# Patient Record
Sex: FEMALE | Race: BLACK OR AFRICAN AMERICAN | NOT HISPANIC OR LATINO
[De-identification: names, ages, dates, MRNs, and addresses within clinical notes are randomized per-mention and may not be internally consistent; named-entity substitution may affect disease eponyms.]

---

## 2017-01-27 ENCOUNTER — APPOINTMENT (OUTPATIENT)
Dept: INTERNAL MEDICINE | Facility: CLINIC | Age: 62
End: 2017-01-27

## 2017-01-27 VITALS
WEIGHT: 163 LBS | HEIGHT: 70 IN | TEMPERATURE: 97.7 F | SYSTOLIC BLOOD PRESSURE: 118 MMHG | DIASTOLIC BLOOD PRESSURE: 80 MMHG | OXYGEN SATURATION: 97 % | HEART RATE: 77 BPM | BODY MASS INDEX: 23.34 KG/M2

## 2017-03-09 ENCOUNTER — RX RENEWAL (OUTPATIENT)
Age: 62
End: 2017-03-09

## 2017-04-06 ENCOUNTER — RX RENEWAL (OUTPATIENT)
Age: 62
End: 2017-04-06

## 2017-04-13 ENCOUNTER — FORM ENCOUNTER (OUTPATIENT)
Age: 62
End: 2017-04-13

## 2017-04-14 ENCOUNTER — APPOINTMENT (OUTPATIENT)
Dept: INTERNAL MEDICINE | Facility: CLINIC | Age: 62
End: 2017-04-14

## 2017-04-14 ENCOUNTER — OUTPATIENT (OUTPATIENT)
Dept: OUTPATIENT SERVICES | Facility: HOSPITAL | Age: 62
LOS: 1 days | End: 2017-04-14
Payer: COMMERCIAL

## 2017-04-14 VITALS
BODY MASS INDEX: 23.78 KG/M2 | HEIGHT: 70.5 IN | SYSTOLIC BLOOD PRESSURE: 122 MMHG | TEMPERATURE: 98.8 F | WEIGHT: 168 LBS | HEART RATE: 91 BPM | OXYGEN SATURATION: 98 % | DIASTOLIC BLOOD PRESSURE: 80 MMHG

## 2017-04-14 DIAGNOSIS — D25.9 LEIOMYOMA OF UTERUS, UNSPECIFIED: ICD-10-CM

## 2017-04-14 LAB
25(OH)D3 SERPL-MCNC: 33.6 NG/ML
AFP-TM SERPL-MCNC: 5.5 NG/ML
ALBUMIN SERPL ELPH-MCNC: 4.1 G/DL
ALP BLD-CCNC: 61 U/L
ALT SERPL-CCNC: 17 U/L
ANION GAP SERPL CALC-SCNC: 14 MMOL/L
AST SERPL-CCNC: 18 U/L
BASOPHILS # BLD AUTO: 0.04 K/UL
BASOPHILS NFR BLD AUTO: 0.4 %
BILIRUB SERPL-MCNC: 0.5 MG/DL
BUN SERPL-MCNC: 16 MG/DL
CALCIUM SERPL-MCNC: 9.9 MG/DL
CHLORIDE SERPL-SCNC: 103 MMOL/L
CHOLEST SERPL-MCNC: 241 MG/DL
CHOLEST/HDLC SERPL: 3.8 RATIO
CO2 SERPL-SCNC: 25 MMOL/L
CREAT SERPL-MCNC: 0.87 MG/DL
EOSINOPHIL # BLD AUTO: 0.48 K/UL
EOSINOPHIL NFR BLD AUTO: 5.3 %
GLUCOSE SERPL-MCNC: 74 MG/DL
HCT VFR BLD CALC: 44 %
HCV RNA SERPL NAA DL=5-ACNC: ABNORMAL IU/ML
HCV RNA SERPL NAA+PROBE-LOG IU: 5.4 LOGIU/ML
HDLC SERPL-MCNC: 64 MG/DL
HGB BLD-MCNC: 14.1 G/DL
IMM GRANULOCYTES NFR BLD AUTO: 0.1 %
LDLC SERPL CALC-MCNC: 159 MG/DL
LYMPHOCYTES # BLD AUTO: 2.93 K/UL
LYMPHOCYTES NFR BLD AUTO: 32.3 %
MAN DIFF?: NORMAL
MCHC RBC-ENTMCNC: 30.1 PG
MCHC RBC-ENTMCNC: 32 GM/DL
MCV RBC AUTO: 93.8 FL
MONOCYTES # BLD AUTO: 0.74 K/UL
MONOCYTES NFR BLD AUTO: 8.2 %
NEUTROPHILS # BLD AUTO: 4.86 K/UL
NEUTROPHILS NFR BLD AUTO: 53.7 %
PLATELET # BLD AUTO: 219 K/UL
POTASSIUM SERPL-SCNC: 4.2 MMOL/L
PROT SERPL-MCNC: 8.5 G/DL
RBC # BLD: 4.69 M/UL
RBC # FLD: 14 %
SODIUM SERPL-SCNC: 142 MMOL/L
T4 FREE SERPL-MCNC: 1.4 NG/DL
TRIGL SERPL-MCNC: 92 MG/DL
TSH SERPL-ACNC: 4.93 UIU/ML
WBC # FLD AUTO: 9.06 K/UL

## 2017-04-14 PROCEDURE — 76536 US EXAM OF HEAD AND NECK: CPT

## 2017-04-14 PROCEDURE — 76830 TRANSVAGINAL US NON-OB: CPT

## 2017-04-14 PROCEDURE — 76536 US EXAM OF HEAD AND NECK: CPT | Mod: 26

## 2017-04-14 PROCEDURE — 76830 TRANSVAGINAL US NON-OB: CPT | Mod: 26

## 2017-04-14 PROCEDURE — 76700 US EXAM ABDOM COMPLETE: CPT

## 2017-04-14 PROCEDURE — 76700 US EXAM ABDOM COMPLETE: CPT | Mod: 26

## 2017-04-14 PROCEDURE — 76856 US EXAM PELVIC COMPLETE: CPT | Mod: 26

## 2017-04-14 PROCEDURE — 76856 US EXAM PELVIC COMPLETE: CPT

## 2017-04-14 RX ORDER — AZITHROMYCIN DIHYDRATE 250 MG/1
250 TABLET, FILM COATED ORAL
Qty: 1 | Refills: 0 | Status: DISCONTINUED | COMMUNITY
Start: 2017-01-27 | End: 2017-04-14

## 2017-05-05 ENCOUNTER — MEDICATION RENEWAL (OUTPATIENT)
Age: 62
End: 2017-05-05

## 2017-06-14 ENCOUNTER — APPOINTMENT (OUTPATIENT)
Age: 62
End: 2017-06-14

## 2017-06-14 VITALS
HEART RATE: 86 BPM | BODY MASS INDEX: 23.08 KG/M2 | RESPIRATION RATE: 12 BRPM | SYSTOLIC BLOOD PRESSURE: 153 MMHG | HEIGHT: 70.5 IN | TEMPERATURE: 98.3 F | DIASTOLIC BLOOD PRESSURE: 91 MMHG | WEIGHT: 163 LBS

## 2017-06-15 LAB
ALBUMIN SERPL ELPH-MCNC: 4.3 G/DL
ALP BLD-CCNC: 49 U/L
ALT SERPL-CCNC: 32 U/L
ANION GAP SERPL CALC-SCNC: 17 MMOL/L
AST SERPL-CCNC: 35 U/L
BASOPHILS # BLD AUTO: 0.03 K/UL
BASOPHILS NFR BLD AUTO: 0.4 %
BILIRUB SERPL-MCNC: 0.4 MG/DL
BUN SERPL-MCNC: 20 MG/DL
CALCIUM SERPL-MCNC: 10.1 MG/DL
CHLORIDE SERPL-SCNC: 100 MMOL/L
CO2 SERPL-SCNC: 23 MMOL/L
CREAT SERPL-MCNC: 0.96 MG/DL
EOSINOPHIL # BLD AUTO: 0.36 K/UL
EOSINOPHIL NFR BLD AUTO: 5 %
HAV IGG+IGM SER QL: NONREACTIVE
HBV SURFACE AB SER QL: NONREACTIVE
HBV SURFACE AG SER QL: NONREACTIVE
HCT VFR BLD CALC: 42.2 %
HGB BLD-MCNC: 13.7 G/DL
IMM GRANULOCYTES NFR BLD AUTO: 0.1 %
LYMPHOCYTES # BLD AUTO: 3.12 K/UL
LYMPHOCYTES NFR BLD AUTO: 43.3 %
MAN DIFF?: NORMAL
MCHC RBC-ENTMCNC: 29.5 PG
MCHC RBC-ENTMCNC: 32.5 GM/DL
MCV RBC AUTO: 90.8 FL
MONOCYTES # BLD AUTO: 0.57 K/UL
MONOCYTES NFR BLD AUTO: 7.9 %
NEUTROPHILS # BLD AUTO: 3.11 K/UL
NEUTROPHILS NFR BLD AUTO: 43.3 %
PLATELET # BLD AUTO: 295 K/UL
POTASSIUM SERPL-SCNC: 4.2 MMOL/L
PROT SERPL-MCNC: 9.3 G/DL
RBC # BLD: 4.65 M/UL
RBC # FLD: 13.9 %
SMOOTH MUSCLE AB SER QL IF: NORMAL
SODIUM SERPL-SCNC: 140 MMOL/L
WBC # FLD AUTO: 7.2 K/UL

## 2017-06-19 LAB
HCV GENTYP BLD NAA+PROBE: ABNORMAL
HCV RNA SERPL NAA DL=5-ACNC: ABNORMAL IU/ML
HCV RNA SERPL NAA+PROBE-LOG IU: 5.54 LOGIU/ML
IGA SER QL IEP: 236 MG/DL
IGG SER QL IEP: 2340 MG/DL
IGM SER QL IEP: 185 MG/DL

## 2017-07-31 ENCOUNTER — APPOINTMENT (OUTPATIENT)
Age: 62
End: 2017-07-31
Payer: COMMERCIAL

## 2017-07-31 VITALS
HEIGHT: 70.5 IN | SYSTOLIC BLOOD PRESSURE: 143 MMHG | BODY MASS INDEX: 23.64 KG/M2 | DIASTOLIC BLOOD PRESSURE: 92 MMHG | HEART RATE: 71 BPM | WEIGHT: 167 LBS | TEMPERATURE: 98.3 F | RESPIRATION RATE: 14 BRPM

## 2017-07-31 PROCEDURE — 90636 HEP A/HEP B VACC ADULT IM: CPT

## 2017-07-31 PROCEDURE — 91200 LIVER ELASTOGRAPHY: CPT

## 2017-07-31 PROCEDURE — 90471 IMMUNIZATION ADMIN: CPT

## 2017-07-31 PROCEDURE — ZZZZZ: CPT

## 2017-07-31 PROCEDURE — 99213 OFFICE O/P EST LOW 20 MIN: CPT | Mod: 25

## 2017-08-23 RX ORDER — SULFAMETHOXAZOLE AND TRIMETHOPRIM 800; 160 MG/1; MG/1
800-160 TABLET ORAL
Qty: 14 | Refills: 0 | Status: DISCONTINUED | OUTPATIENT
Start: 2017-05-05 | End: 2017-08-23

## 2017-08-31 ENCOUNTER — APPOINTMENT (OUTPATIENT)
Age: 62
End: 2017-08-31
Payer: COMMERCIAL

## 2017-08-31 VITALS
RESPIRATION RATE: 14 BRPM | DIASTOLIC BLOOD PRESSURE: 77 MMHG | SYSTOLIC BLOOD PRESSURE: 139 MMHG | HEART RATE: 83 BPM | HEIGHT: 70.5 IN | BODY MASS INDEX: 23.36 KG/M2 | TEMPERATURE: 98.3 F | WEIGHT: 165 LBS

## 2017-08-31 PROCEDURE — 90471 IMMUNIZATION ADMIN: CPT

## 2017-08-31 PROCEDURE — 90746 HEPB VACCINE 3 DOSE ADULT IM: CPT

## 2017-08-31 PROCEDURE — 99213 OFFICE O/P EST LOW 20 MIN: CPT | Mod: 25

## 2017-09-19 LAB
ALBUMIN SERPL ELPH-MCNC: 4 G/DL
ALP BLD-CCNC: 62 U/L
ALT SERPL-CCNC: 13 U/L
ANION GAP SERPL CALC-SCNC: 13 MMOL/L
AST SERPL-CCNC: 22 U/L
BASOPHILS # BLD AUTO: 0.06 K/UL
BASOPHILS NFR BLD AUTO: 0.8 %
BILIRUB SERPL-MCNC: 0.4 MG/DL
BUN SERPL-MCNC: 9 MG/DL
CALCIUM SERPL-MCNC: 9.8 MG/DL
CHLORIDE SERPL-SCNC: 102 MMOL/L
CO2 SERPL-SCNC: 26 MMOL/L
CREAT SERPL-MCNC: 1.06 MG/DL
EOSINOPHIL # BLD AUTO: 0.45 K/UL
EOSINOPHIL NFR BLD AUTO: 6 %
HCT VFR BLD CALC: 42.5 %
HGB BLD-MCNC: 13.9 G/DL
IMM GRANULOCYTES NFR BLD AUTO: 0.1 %
LYMPHOCYTES # BLD AUTO: 3.15 K/UL
LYMPHOCYTES NFR BLD AUTO: 41.7 %
MAN DIFF?: NORMAL
MCHC RBC-ENTMCNC: 29.5 PG
MCHC RBC-ENTMCNC: 32.7 GM/DL
MCV RBC AUTO: 90.2 FL
MONOCYTES # BLD AUTO: 0.71 K/UL
MONOCYTES NFR BLD AUTO: 9.4 %
NEUTROPHILS # BLD AUTO: 3.18 K/UL
NEUTROPHILS NFR BLD AUTO: 42 %
PLATELET # BLD AUTO: 293 K/UL
POTASSIUM SERPL-SCNC: 4 MMOL/L
PROT SERPL-MCNC: 9 G/DL
RBC # BLD: 4.71 M/UL
RBC # FLD: 13.3 %
SODIUM SERPL-SCNC: 141 MMOL/L
WBC # FLD AUTO: 7.56 K/UL

## 2017-09-21 LAB
HCV RNA SERPL NAA DL=5-ACNC: NORMAL IU/ML
HCV RNA SERPL NAA+PROBE-LOG IU: <1.08 LOGIU/ML

## 2017-09-25 ENCOUNTER — RX RENEWAL (OUTPATIENT)
Age: 62
End: 2017-09-25

## 2017-09-26 ENCOUNTER — RESULT REVIEW (OUTPATIENT)
Age: 62
End: 2017-09-26

## 2017-09-28 ENCOUNTER — APPOINTMENT (OUTPATIENT)
Age: 62
End: 2017-09-28

## 2017-10-06 ENCOUNTER — APPOINTMENT (OUTPATIENT)
Dept: INTERNAL MEDICINE | Facility: CLINIC | Age: 62
End: 2017-10-06

## 2017-10-17 ENCOUNTER — APPOINTMENT (OUTPATIENT)
Age: 62
End: 2017-10-17

## 2017-10-23 LAB
ALBUMIN SERPL ELPH-MCNC: 4 G/DL
ALP BLD-CCNC: 60 U/L
ALT SERPL-CCNC: 16 U/L
ANION GAP SERPL CALC-SCNC: 17 MMOL/L
AST SERPL-CCNC: 24 U/L
BILIRUB SERPL-MCNC: 0.3 MG/DL
BUN SERPL-MCNC: 8 MG/DL
CALCIUM SERPL-MCNC: 10.1 MG/DL
CHLORIDE SERPL-SCNC: 101 MMOL/L
CO2 SERPL-SCNC: 23 MMOL/L
CREAT SERPL-MCNC: 0.99 MG/DL
HCV RNA SERPL NAA DL=5-ACNC: NOT DETECTED
HCV RNA SERPL NAA+PROBE-LOG IU: NOT DETECTED LOGIU/ML
POTASSIUM SERPL-SCNC: 4.4 MMOL/L
PROT SERPL-MCNC: 9.4 G/DL
SODIUM SERPL-SCNC: 141 MMOL/L

## 2017-10-24 ENCOUNTER — RX RENEWAL (OUTPATIENT)
Age: 62
End: 2017-10-24

## 2017-10-25 ENCOUNTER — APPOINTMENT (OUTPATIENT)
Age: 62
End: 2017-10-25

## 2017-10-27 ENCOUNTER — APPOINTMENT (OUTPATIENT)
Age: 62
End: 2017-10-27

## 2017-11-06 ENCOUNTER — FORM ENCOUNTER (OUTPATIENT)
Age: 62
End: 2017-11-06

## 2017-11-07 ENCOUNTER — APPOINTMENT (OUTPATIENT)
Dept: INTERNAL MEDICINE | Facility: CLINIC | Age: 62
End: 2017-11-07
Payer: COMMERCIAL

## 2017-11-07 ENCOUNTER — OUTPATIENT (OUTPATIENT)
Dept: OUTPATIENT SERVICES | Facility: HOSPITAL | Age: 62
LOS: 1 days | End: 2017-11-07
Payer: COMMERCIAL

## 2017-11-07 ENCOUNTER — APPOINTMENT (OUTPATIENT)
Dept: RADIOLOGY | Facility: CLINIC | Age: 62
End: 2017-11-07
Payer: COMMERCIAL

## 2017-11-07 ENCOUNTER — LABORATORY RESULT (OUTPATIENT)
Age: 62
End: 2017-11-07

## 2017-11-07 VITALS
OXYGEN SATURATION: 96 % | WEIGHT: 166 LBS | HEART RATE: 67 BPM | SYSTOLIC BLOOD PRESSURE: 154 MMHG | BODY MASS INDEX: 23.5 KG/M2 | HEIGHT: 70.5 IN | TEMPERATURE: 98.5 F | DIASTOLIC BLOOD PRESSURE: 90 MMHG

## 2017-11-07 DIAGNOSIS — J20.9 ACUTE BRONCHITIS, UNSPECIFIED: ICD-10-CM

## 2017-11-07 PROCEDURE — 99214 OFFICE O/P EST MOD 30 MIN: CPT | Mod: 25

## 2017-11-07 PROCEDURE — 90686 IIV4 VACC NO PRSV 0.5 ML IM: CPT

## 2017-11-07 PROCEDURE — G0008: CPT

## 2017-11-07 PROCEDURE — 71046 X-RAY EXAM CHEST 2 VIEWS: CPT

## 2017-11-07 PROCEDURE — 71020: CPT | Mod: 26

## 2017-11-07 RX ORDER — LEDIPASVIR AND SOFOSBUVIR 90; 400 MG/1; MG/1
90-400 TABLET, FILM COATED ORAL
Qty: 28 | Refills: 2 | Status: COMPLETED | COMMUNITY
Start: 2017-07-31 | End: 2017-11-07

## 2017-11-24 ENCOUNTER — RX RENEWAL (OUTPATIENT)
Age: 62
End: 2017-11-24

## 2017-11-28 ENCOUNTER — MED ADMIN CHARGE (OUTPATIENT)
Age: 62
End: 2017-11-28

## 2017-11-28 ENCOUNTER — MEDICATION RENEWAL (OUTPATIENT)
Age: 62
End: 2017-11-28

## 2017-11-28 LAB
BASOPHILS # BLD AUTO: 0.04 K/UL
BASOPHILS NFR BLD AUTO: 0.5 %
EOSINOPHIL # BLD AUTO: 0.41 K/UL
EOSINOPHIL NFR BLD AUTO: 5.2 %
HBA1C MFR BLD HPLC: 5.7 %
HCT VFR BLD CALC: 46.9 %
HGB BLD-MCNC: 15.7 G/DL
IMM GRANULOCYTES NFR BLD AUTO: 0.6 %
LYMPHOCYTES # BLD AUTO: 2.75 K/UL
LYMPHOCYTES NFR BLD AUTO: 34.9 %
MAN DIFF?: NORMAL
MCHC RBC-ENTMCNC: 30.2 PG
MCHC RBC-ENTMCNC: 33.5 GM/DL
MCV RBC AUTO: 90.2 FL
MONOCYTES # BLD AUTO: 0.46 K/UL
MONOCYTES NFR BLD AUTO: 5.8 %
NEUTROPHILS # BLD AUTO: 4.18 K/UL
NEUTROPHILS NFR BLD AUTO: 53 %
PLATELET # BLD AUTO: 248 K/UL
RBC # BLD: 5.2 M/UL
RBC # FLD: 13.3 %
T4 FREE SERPL-MCNC: 1.3 NG/DL
TSH SERPL-ACNC: 8.97 UIU/ML
WBC # FLD AUTO: 7.89 K/UL

## 2017-11-28 RX ORDER — AZITHROMYCIN 250 MG/1
250 TABLET, FILM COATED ORAL
Qty: 1 | Refills: 0 | Status: DISCONTINUED | COMMUNITY
Start: 2017-11-07 | End: 2017-11-28

## 2018-02-06 ENCOUNTER — APPOINTMENT (OUTPATIENT)
Age: 63
End: 2018-02-06

## 2018-02-09 ENCOUNTER — APPOINTMENT (OUTPATIENT)
Age: 63
End: 2018-02-09

## 2018-02-21 ENCOUNTER — RX RENEWAL (OUTPATIENT)
Age: 63
End: 2018-02-21

## 2018-03-20 ENCOUNTER — APPOINTMENT (OUTPATIENT)
Dept: INTERNAL MEDICINE | Facility: CLINIC | Age: 63
End: 2018-03-20
Payer: COMMERCIAL

## 2018-03-20 VITALS
SYSTOLIC BLOOD PRESSURE: 138 MMHG | OXYGEN SATURATION: 98 % | WEIGHT: 158 LBS | HEART RATE: 79 BPM | DIASTOLIC BLOOD PRESSURE: 80 MMHG | HEIGHT: 70.5 IN | BODY MASS INDEX: 22.37 KG/M2

## 2018-03-20 PROCEDURE — 99214 OFFICE O/P EST MOD 30 MIN: CPT

## 2018-03-27 ENCOUNTER — APPOINTMENT (OUTPATIENT)
Dept: DERMATOLOGY | Facility: CLINIC | Age: 63
End: 2018-03-27
Payer: COMMERCIAL

## 2018-03-27 VITALS
SYSTOLIC BLOOD PRESSURE: 135 MMHG | DIASTOLIC BLOOD PRESSURE: 89 MMHG | HEART RATE: 62 BPM | HEIGHT: 70.5 IN | OXYGEN SATURATION: 99 %

## 2018-03-27 DIAGNOSIS — L81.9 DISORDER OF PIGMENTATION, UNSPECIFIED: ICD-10-CM

## 2018-03-27 PROCEDURE — 99243 OFF/OP CNSLTJ NEW/EST LOW 30: CPT

## 2018-04-30 ENCOUNTER — APPOINTMENT (OUTPATIENT)
Dept: INTERNAL MEDICINE | Facility: CLINIC | Age: 63
End: 2018-04-30
Payer: COMMERCIAL

## 2018-04-30 ENCOUNTER — LABORATORY RESULT (OUTPATIENT)
Age: 63
End: 2018-04-30

## 2018-04-30 VITALS
HEART RATE: 67 BPM | BODY MASS INDEX: 22.05 KG/M2 | SYSTOLIC BLOOD PRESSURE: 138 MMHG | DIASTOLIC BLOOD PRESSURE: 80 MMHG | OXYGEN SATURATION: 98 % | WEIGHT: 154 LBS | HEIGHT: 70 IN

## 2018-04-30 DIAGNOSIS — J02.0 STREPTOCOCCAL PHARYNGITIS: ICD-10-CM

## 2018-04-30 DIAGNOSIS — N39.0 URINARY TRACT INFECTION, SITE NOT SPECIFIED: ICD-10-CM

## 2018-04-30 DIAGNOSIS — M54.42 LUMBAGO WITH SCIATICA, LEFT SIDE: ICD-10-CM

## 2018-04-30 DIAGNOSIS — E04.0 NONTOXIC DIFFUSE GOITER: ICD-10-CM

## 2018-04-30 DIAGNOSIS — Z87.09 PERSONAL HISTORY OF OTHER DISEASES OF THE RESPIRATORY SYSTEM: ICD-10-CM

## 2018-04-30 PROCEDURE — 36415 COLL VENOUS BLD VENIPUNCTURE: CPT

## 2018-04-30 PROCEDURE — 99396 PREV VISIT EST AGE 40-64: CPT | Mod: 25

## 2018-04-30 RX ORDER — DOXYCYCLINE HYCLATE 100 MG/1
100 TABLET ORAL DAILY
Qty: 7 | Refills: 1 | Status: DISCONTINUED | COMMUNITY
Start: 2018-03-20 | End: 2018-04-30

## 2018-05-10 LAB
25(OH)D3 SERPL-MCNC: 14 NG/ML
ALBUMIN MFR SERPL ELPH: 47.4 %
ALBUMIN SERPL ELPH-MCNC: 4.5 G/DL
ALBUMIN SERPL-MCNC: 4.3 G/DL
ALBUMIN/GLOB SERPL: 0.9 RATIO
ALP BLD-CCNC: 68 U/L
ALPHA1 GLOB MFR SERPL ELPH: 3.2 %
ALPHA1 GLOB SERPL ELPH-MCNC: 0.3 G/DL
ALPHA2 GLOB MFR SERPL ELPH: 10.1 %
ALPHA2 GLOB SERPL ELPH-MCNC: 0.9 G/DL
ALT SERPL-CCNC: 18 U/L
ANION GAP SERPL CALC-SCNC: 17 MMOL/L
AST SERPL-CCNC: 22 U/L
B-GLOBULIN MFR SERPL ELPH: 16.3 %
B-GLOBULIN SERPL ELPH-MCNC: 1.5 G/DL
BASOPHILS # BLD AUTO: 0.04 K/UL
BASOPHILS NFR BLD AUTO: 0.6 %
BILIRUB SERPL-MCNC: 0.3 MG/DL
BUN SERPL-MCNC: 13 MG/DL
CALCIUM SERPL-MCNC: 10.2 MG/DL
CHLORIDE SERPL-SCNC: 100 MMOL/L
CHOLEST SERPL-MCNC: 251 MG/DL
CHOLEST/HDLC SERPL: 3.8 RATIO
CO2 SERPL-SCNC: 26 MMOL/L
CREAT SERPL-MCNC: 0.84 MG/DL
EOSINOPHIL # BLD AUTO: 0.4 K/UL
EOSINOPHIL NFR BLD AUTO: 5.5 %
GAMMA GLOB FLD ELPH-MCNC: 2.1 G/DL
GAMMA GLOB MFR SERPL ELPH: 23 %
GLUCOSE SERPL-MCNC: 81 MG/DL
HBA1C MFR BLD HPLC: 5.8 %
HCT VFR BLD CALC: 47 %
HCV RNA FLD QL NAA+PROBE: NORMAL
HCV RNA SPEC QL PROBE+SIG AMP: NOT DETECTED
HDLC SERPL-MCNC: 66 MG/DL
HGB BLD-MCNC: 15.7 G/DL
IMM GRANULOCYTES NFR BLD AUTO: 0 %
INTERPRETATION SERPL IEP-IMP: NORMAL
LDLC SERPL CALC-MCNC: 167 MG/DL
LYMPHOCYTES # BLD AUTO: 2.8 K/UL
LYMPHOCYTES NFR BLD AUTO: 38.8 %
MAN DIFF?: NORMAL
MCHC RBC-ENTMCNC: 30.7 PG
MCHC RBC-ENTMCNC: 33.4 GM/DL
MCV RBC AUTO: 92 FL
MONOCYTES # BLD AUTO: 0.43 K/UL
MONOCYTES NFR BLD AUTO: 6 %
NEUTROPHILS # BLD AUTO: 3.55 K/UL
NEUTROPHILS NFR BLD AUTO: 49.1 %
PLATELET # BLD AUTO: 258 K/UL
POTASSIUM SERPL-SCNC: 4.3 MMOL/L
PROT SERPL-MCNC: 9 G/DL
RBC # BLD: 5.11 M/UL
RBC # FLD: 13.5 %
SODIUM SERPL-SCNC: 143 MMOL/L
T4 FREE SERPL-MCNC: 1.5 NG/DL
TRIGL SERPL-MCNC: 90 MG/DL
TSH SERPL-ACNC: 2.92 UIU/ML
WBC # FLD AUTO: 7.22 K/UL

## 2018-06-22 ENCOUNTER — APPOINTMENT (OUTPATIENT)
Dept: INTERNAL MEDICINE | Facility: CLINIC | Age: 63
End: 2018-06-22

## 2018-07-05 ENCOUNTER — APPOINTMENT (OUTPATIENT)
Dept: INTERNAL MEDICINE | Facility: CLINIC | Age: 63
End: 2018-07-05
Payer: SELF-PAY

## 2018-07-05 VITALS
BODY MASS INDEX: 22.76 KG/M2 | TEMPERATURE: 98.2 F | OXYGEN SATURATION: 97 % | SYSTOLIC BLOOD PRESSURE: 140 MMHG | HEART RATE: 77 BPM | WEIGHT: 159 LBS | DIASTOLIC BLOOD PRESSURE: 90 MMHG | HEIGHT: 70 IN

## 2018-07-05 PROCEDURE — 99214 OFFICE O/P EST MOD 30 MIN: CPT

## 2018-07-05 NOTE — ASSESSMENT
[FreeTextEntry1] : The patient is a 63-year-old female with former drug and alcohol abuse at, smoker, history of chronic hepatitis C status post therapy, pulmonary nodules, hypertension, hypothyroidism, vitamin D deficiency and cervical neck disease polycythemia who presents for complaint of left arm numbness and left thigh bruise\par \par #1 left arm numbness\par Most likely cervical radiculopathy\par Repeat MRI of the neck\par Follow up in 4 weeks\par \par #2 hypertension\par DC'd hydrochlorothiazide stop lisinopril hydrochlorothiazide 10 mg/25 one tablet p.o. q. day\par Follow up in 4 weeks\par \par #3 pulmonary nodules\par Repeat CT patient missed appointment\par \par #4 health maintenance\par Reschedule colonoscopy\par \par #5 ecchymosis left thigh\par Normal platelet count last blood work\par Warm soaks observe

## 2018-07-05 NOTE — PHYSICAL EXAM
[No Acute Distress] : no acute distress [Well Nourished] : well nourished [Normal Sclera/Conjunctiva] : normal sclera/conjunctiva [PERRL] : pupils equal round and reactive to light [Normal Outer Ear/Nose] : the outer ears and nose were normal in appearance [Normal Oropharynx] : the oropharynx was normal [No JVD] : no jugular venous distention [Supple] : supple [No Respiratory Distress] : no respiratory distress  [Clear to Auscultation] : lungs were clear to auscultation bilaterally [No Accessory Muscle Use] : no accessory muscle use [Normal Rate] : normal rate  [Regular Rhythm] : with a regular rhythm [Normal S1, S2] : normal S1 and S2 [Soft] : abdomen soft [Non Tender] : non-tender [No HSM] : no HSM [Normal Bowel Sounds] : normal bowel sounds [No CVA Tenderness] : no CVA  tenderness [No Spinal Tenderness] : no spinal tenderness [Speech Grossly Normal] : speech grossly normal [Normal Mood] : the mood was normal [de-identified] : negative tinel and phalen sign

## 2018-07-05 NOTE — HISTORY OF PRESENT ILLNESS
[FreeTextEntry8] : CC: Left arm numbness and bruise on left calf\par \par The patient is a 63-year-old female with history of former alcohol drug abuse, smoking, prediabetes, hypertension, hypothyroidism, fibroids, history of chronic hep C status post treatment, allergic rhinitis, pulmonary nodules, who presents for complaint of several week history of left arm numbness progressive no weakness no trauma not worse at night. Otherwise patient feels well complains of left calf bruise denies fever, chills, polyuria, polydipsia cold or heat intolerance

## 2018-07-05 NOTE — HEALTH RISK ASSESSMENT
[0] : 2) Feeling down, depressed, or hopeless: Not at all (0) [] : No [de-identified] : former [de-identified] : former

## 2018-07-05 NOTE — REVIEW OF SYSTEMS
[Negative] : Neurological [Dizziness] : no dizziness [Fainting] : no fainting [Confusion] : no confusion [Unsteady Walking] : no ataxia [de-identified] : left calf 5 cm ecchymose [de-identified] : negative Tinel and Phalen

## 2018-11-26 ENCOUNTER — RX RENEWAL (OUTPATIENT)
Age: 63
End: 2018-11-26

## 2019-02-25 ENCOUNTER — RX RENEWAL (OUTPATIENT)
Age: 64
End: 2019-02-25

## 2019-05-29 ENCOUNTER — RX RENEWAL (OUTPATIENT)
Age: 64
End: 2019-05-29

## 2019-08-28 ENCOUNTER — RX RENEWAL (OUTPATIENT)
Age: 64
End: 2019-08-28

## 2019-10-08 ENCOUNTER — APPOINTMENT (OUTPATIENT)
Dept: INTERNAL MEDICINE | Facility: CLINIC | Age: 64
End: 2019-10-08

## 2019-10-08 ENCOUNTER — APPOINTMENT (OUTPATIENT)
Dept: INTERNAL MEDICINE | Facility: CLINIC | Age: 64
End: 2019-10-08
Payer: COMMERCIAL

## 2019-10-08 ENCOUNTER — LABORATORY RESULT (OUTPATIENT)
Age: 64
End: 2019-10-08

## 2019-10-08 VITALS — DIASTOLIC BLOOD PRESSURE: 90 MMHG | SYSTOLIC BLOOD PRESSURE: 140 MMHG

## 2019-10-08 VITALS
HEART RATE: 69 BPM | BODY MASS INDEX: 22.76 KG/M2 | WEIGHT: 159 LBS | HEIGHT: 70 IN | SYSTOLIC BLOOD PRESSURE: 140 MMHG | DIASTOLIC BLOOD PRESSURE: 80 MMHG | OXYGEN SATURATION: 97 %

## 2019-10-08 DIAGNOSIS — M54.12 RADICULOPATHY, CERVICAL REGION: ICD-10-CM

## 2019-10-08 PROCEDURE — 99406 BEHAV CHNG SMOKING 3-10 MIN: CPT

## 2019-10-08 PROCEDURE — 36415 COLL VENOUS BLD VENIPUNCTURE: CPT

## 2019-10-08 PROCEDURE — 99215 OFFICE O/P EST HI 40 MIN: CPT | Mod: 25

## 2019-10-22 ENCOUNTER — MEDICATION RENEWAL (OUTPATIENT)
Age: 64
End: 2019-10-22

## 2019-10-22 ENCOUNTER — APPOINTMENT (OUTPATIENT)
Dept: INTERNAL MEDICINE | Facility: CLINIC | Age: 64
End: 2019-10-22
Payer: COMMERCIAL

## 2019-10-22 VITALS
HEART RATE: 87 BPM | TEMPERATURE: 98.5 F | DIASTOLIC BLOOD PRESSURE: 80 MMHG | HEIGHT: 70 IN | WEIGHT: 157 LBS | OXYGEN SATURATION: 98 % | SYSTOLIC BLOOD PRESSURE: 162 MMHG | BODY MASS INDEX: 22.48 KG/M2

## 2019-10-22 LAB
ALBUMIN SERPL ELPH-MCNC: 4.2 G/DL
ALP BLD-CCNC: 68 U/L
ALT SERPL-CCNC: 21 U/L
ANION GAP SERPL CALC-SCNC: 13 MMOL/L
AST SERPL-CCNC: 24 U/L
BASOPHILS # BLD AUTO: 0.05 K/UL
BASOPHILS NFR BLD AUTO: 0.8 %
BILIRUB SERPL-MCNC: 0.3 MG/DL
BUN SERPL-MCNC: 12 MG/DL
CALCIUM SERPL-MCNC: 10.2 MG/DL
CHLORIDE SERPL-SCNC: 101 MMOL/L
CHOLEST SERPL-MCNC: 206 MG/DL
CHOLEST/HDLC SERPL: 3.4 RATIO
CO2 SERPL-SCNC: 26 MMOL/L
CREAT SERPL-MCNC: 0.72 MG/DL
EOSINOPHIL # BLD AUTO: 0.53 K/UL
EOSINOPHIL NFR BLD AUTO: 8.2 %
ESTIMATED AVERAGE GLUCOSE: 120 MG/DL
GLUCOSE SERPL-MCNC: 90 MG/DL
HBA1C MFR BLD HPLC: 5.8 %
HCT VFR BLD CALC: 45.4 %
HCV RNA FLD QL NAA+PROBE: NORMAL
HCV RNA SPEC QL PROBE+SIG AMP: NOT DETECTED
HDLC SERPL-MCNC: 61 MG/DL
HGB BLD-MCNC: 14.3 G/DL
IMM GRANULOCYTES NFR BLD AUTO: 0.2 %
LDLC SERPL CALC-MCNC: 124 MG/DL
LYMPHOCYTES # BLD AUTO: 2.21 K/UL
LYMPHOCYTES NFR BLD AUTO: 34.3 %
MAN DIFF?: NORMAL
MCHC RBC-ENTMCNC: 29.1 PG
MCHC RBC-ENTMCNC: 31.5 GM/DL
MCV RBC AUTO: 92.5 FL
MONOCYTES # BLD AUTO: 0.49 K/UL
MONOCYTES NFR BLD AUTO: 7.6 %
NEUTROPHILS # BLD AUTO: 3.16 K/UL
NEUTROPHILS NFR BLD AUTO: 48.9 %
PLATELET # BLD AUTO: NORMAL K/UL
POTASSIUM SERPL-SCNC: 5 MMOL/L
PROT SERPL-MCNC: 8.4 G/DL
RBC # BLD: 4.91 M/UL
RBC # FLD: 13.4 %
SODIUM SERPL-SCNC: 140 MMOL/L
T4 FREE SERPL-MCNC: 1.6 NG/DL
TRIGL SERPL-MCNC: 103 MG/DL
TSH SERPL-ACNC: 2.3 UIU/ML
WBC # FLD AUTO: 6.45 K/UL

## 2019-10-22 PROCEDURE — 99214 OFFICE O/P EST MOD 30 MIN: CPT | Mod: 25

## 2019-10-22 PROCEDURE — 90688 IIV4 VACCINE SPLT 0.5 ML IM: CPT

## 2019-10-22 PROCEDURE — G0008: CPT

## 2019-10-23 ENCOUNTER — MED ADMIN CHARGE (OUTPATIENT)
Age: 64
End: 2019-10-23

## 2019-10-23 NOTE — ASSESSMENT
[FreeTextEntry1] : Patient is a 64-year-old female with history of former alcohol drug abuser abstinence for 28 years, history of chronic viral hepatitis C status post treatment undetectable, hypothyroidism, hypertension, cervical disc disease, pulmonary nodules, thyroid nodules prediabetes vitamin D deficiency who presents for follow-up\par \par 1. Right arm numbness\par most likely cervical disc disease\par overweight MRI continue Tylenol and Advil now refuse physical therapy\par \par 2 hypertension\par reason well-controlled\par continue hydrochlorothiazide 25 mg normal calcium and potassium\par \par 3. Hypothyroidism\par continue levothyroxine hundred Rahul's you thyroid by labs\par observe\par \par 4. History of chronic hep C\par viral load undetectable\par \par  5. Prediabetes\par hemoglobin A1c 5.8 counseled on diet\par \par 6. History of pulmonary nodule smoker\par refer for CT of chest for stability of nodules\par \par 7. Thyroid nodule\par check sonogram you thyroid stability of nodule\par \par 8 health maintenance\par schedule CPE\par referral for mammogram, colonoscopy,\par flu shot given\par follow-up on CPE\par

## 2019-10-23 NOTE — HISTORY OF PRESENT ILLNESS
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

## 2019-10-23 NOTE — PHYSICAL EXAM
[Normal] : soft, non-tender, non-distended, no masses palpated, no HSM and normal bowel sounds [No Joint Swelling] : no joint swelling [Grossly Normal Strength/Tone] : grossly normal strength/tone [de-identified] : Negative Fausto negative tinel negative balance phalen

## 2019-10-23 NOTE — HISTORY OF PRESENT ILLNESS
[FreeTextEntry1] : Follow-up for hep C, hypothyroidism, hypertension [de-identified] : Patient is a 64-year-old female with history of chronic viral hepatitis C status post treatment with Haroni, secondary to former alcohol and drug use, hypothyroidism, hypertension, prediabetes, cervical disc radiculopathy, pulmonary nodules, thyroid nodule vitamin D deficiency who presents for follow-up patient feels well still has right sided numbness on a bill to get MRI yet taking Tylenol or Advil as needed denies chest pain, shortness of breath distant exertion

## 2019-10-23 NOTE — ASSESSMENT
[FreeTextEntry1] : Patient is a 64-year-old female with history of former drug user, former alcoholic, chronic hep C status post treatment, hypothyroidism, hypertension, prediabetes vitamin D insufficiency who presents for follow-up and complaint of right hand numbness\par \par 1 right hand numbness\par most likely cervical disc disease refuses EMGs\par MRI of the neck\par follow-up in 2 to 4 weeks\par \par 2. Chronic hep C status post treatment\par check hepatitis C viral load\par \par 3. Hypothyroidism\par continue levothyroxine check TFTs\par \par 4. Prediabetes\par counseling diet check hemoglobin A1c\par \par 5. Smoking\par counseled on cessation\par \par 6. Hypertension\par continue hydrochlorothiazide consider adding second agent\par will check EKG next visit\par \par 7. Health maintenance\par coverage flu shot\par follow-up with CPE in two weeks

## 2019-10-23 NOTE — COUNSELING
[Risk of tobacco use and health benefits of smoking cessation discussed] : Risk of tobacco use and health benefits of smoking cessation discussed [Cessation strategies including cessation program discussed] : Cessation strategies including cessation program discussed [Tobacco Use Cessation Intermediate Greater Than 3 Minutes Up to 10 Minutes] : Tobacco Use Cessation Intermediate Greater Than 3 Minutes Up to 10 Minutes [FreeTextEntry1] : 5

## 2019-10-23 NOTE — REVIEW OF SYSTEMS
[Joint Pain] : no joint pain [Joint Stiffness] : no joint stiffness [Joint Swelling] : no joint swelling [Muscle Weakness] : no muscle weakness [Muscle Pain] : muscle pain [Back Pain] : no back pain [Negative] : Genitourinary

## 2019-11-06 ENCOUNTER — RX RENEWAL (OUTPATIENT)
Age: 64
End: 2019-11-06

## 2019-12-01 ENCOUNTER — RX RENEWAL (OUTPATIENT)
Age: 64
End: 2019-12-01

## 2019-12-03 ENCOUNTER — APPOINTMENT (OUTPATIENT)
Dept: INTERNAL MEDICINE | Facility: CLINIC | Age: 64
End: 2019-12-03

## 2020-04-20 ENCOUNTER — TRANSCRIPTION ENCOUNTER (OUTPATIENT)
Age: 65
End: 2020-04-20

## 2020-04-21 ENCOUNTER — TRANSCRIPTION ENCOUNTER (OUTPATIENT)
Age: 65
End: 2020-04-21

## 2020-06-08 ENCOUNTER — RX RENEWAL (OUTPATIENT)
Age: 65
End: 2020-06-08

## 2020-08-07 ENCOUNTER — RX RENEWAL (OUTPATIENT)
Age: 65
End: 2020-08-07

## 2020-08-26 ENCOUNTER — RX RENEWAL (OUTPATIENT)
Age: 65
End: 2020-08-26

## 2020-09-04 ENCOUNTER — TRANSCRIPTION ENCOUNTER (OUTPATIENT)
Age: 65
End: 2020-09-04

## 2020-09-24 ENCOUNTER — NON-APPOINTMENT (OUTPATIENT)
Age: 65
End: 2020-09-24

## 2020-09-24 ENCOUNTER — LABORATORY RESULT (OUTPATIENT)
Age: 65
End: 2020-09-24

## 2020-09-24 ENCOUNTER — APPOINTMENT (OUTPATIENT)
Dept: INTERNAL MEDICINE | Facility: CLINIC | Age: 65
End: 2020-09-24
Payer: SELF-PAY

## 2020-09-24 VITALS
SYSTOLIC BLOOD PRESSURE: 141 MMHG | HEIGHT: 69 IN | WEIGHT: 160 LBS | TEMPERATURE: 98.9 F | DIASTOLIC BLOOD PRESSURE: 81 MMHG | OXYGEN SATURATION: 98 % | BODY MASS INDEX: 23.7 KG/M2 | HEART RATE: 69 BPM

## 2020-09-24 VITALS — DIASTOLIC BLOOD PRESSURE: 88 MMHG | SYSTOLIC BLOOD PRESSURE: 144 MMHG

## 2020-09-24 PROCEDURE — 36415 COLL VENOUS BLD VENIPUNCTURE: CPT

## 2020-09-24 PROCEDURE — 90686 IIV4 VACC NO PRSV 0.5 ML IM: CPT

## 2020-09-24 PROCEDURE — G0008: CPT

## 2020-09-24 PROCEDURE — 93000 ELECTROCARDIOGRAM COMPLETE: CPT

## 2020-09-24 PROCEDURE — 99396 PREV VISIT EST AGE 40-64: CPT | Mod: 25

## 2020-09-24 NOTE — PHYSICAL EXAM
[Normal Appearance] : normal in appearance [No Nipple Discharge] : no nipple discharge [Normal Sclera/Conjunctiva] : normal sclera/conjunctiva [Normal Outer Ear/Nose] : the outer ears and nose were normal in appearance [No Masses] : no palpable masses [No Axillary Lymphadenopathy] : no axillary lymphadenopathy [Normal] : affect was normal and insight and judgment were intact [de-identified] : Right ear cerumen

## 2020-09-24 NOTE — PHYSICAL EXAM
[Normal Appearance] : normal in appearance [No Nipple Discharge] : no nipple discharge [Normal Sclera/Conjunctiva] : normal sclera/conjunctiva [Normal Outer Ear/Nose] : the outer ears and nose were normal in appearance [No Masses] : no palpable masses [No Axillary Lymphadenopathy] : no axillary lymphadenopathy [Normal] : affect was normal and insight and judgment were intact [de-identified] : Right ear cerumen

## 2020-10-05 LAB
25(OH)D3 SERPL-MCNC: 67.1 NG/ML
ALBUMIN SERPL ELPH-MCNC: 4.2 G/DL
ALP BLD-CCNC: 64 U/L
ALT SERPL-CCNC: 13 U/L
ANION GAP SERPL CALC-SCNC: 17 MMOL/L
AST SERPL-CCNC: 19 U/L
BASOPHILS # BLD AUTO: 0.06 K/UL
BASOPHILS NFR BLD AUTO: 0.8 %
BILIRUB SERPL-MCNC: 0.3 MG/DL
BUN SERPL-MCNC: 18 MG/DL
CALCIUM SERPL-MCNC: 9.3 MG/DL
CHLORIDE SERPL-SCNC: 106 MMOL/L
CHOLEST SERPL-MCNC: 212 MG/DL
CHOLEST/HDLC SERPL: 3.7 RATIO
CO2 SERPL-SCNC: 19 MMOL/L
CREAT SERPL-MCNC: 0.73 MG/DL
EOSINOPHIL # BLD AUTO: 0.4 K/UL
EOSINOPHIL NFR BLD AUTO: 5.5 %
ESTIMATED AVERAGE GLUCOSE: 128 MG/DL
HBA1C MFR BLD HPLC: 6.1 %
HCT VFR BLD CALC: 44 %
HCV AB SER QL: REACTIVE
HCV S/CO RATIO: 13.77 S/CO
HDLC SERPL-MCNC: 58 MG/DL
HGB BLD-MCNC: 13.5 G/DL
IMM GRANULOCYTES NFR BLD AUTO: 0.1 %
LDLC SERPL CALC-MCNC: 131 MG/DL
LYMPHOCYTES # BLD AUTO: 2.88 K/UL
LYMPHOCYTES NFR BLD AUTO: 39.8 %
MAN DIFF?: NORMAL
MCHC RBC-ENTMCNC: 29.3 PG
MCHC RBC-ENTMCNC: 30.7 GM/DL
MCV RBC AUTO: 95.4 FL
MONOCYTES # BLD AUTO: 0.5 K/UL
MONOCYTES NFR BLD AUTO: 6.9 %
NEUTROPHILS # BLD AUTO: 3.39 K/UL
NEUTROPHILS NFR BLD AUTO: 46.9 %
PLATELET # BLD AUTO: 185 K/UL
POTASSIUM SERPL-SCNC: 4.7 MMOL/L
PROT SERPL-MCNC: 7.8 G/DL
RBC # BLD: 4.61 M/UL
RBC # FLD: 13.2 %
SARS-COV-2 IGG SERPL IA-ACNC: 0.09 INDEX
SARS-COV-2 IGG SERPL QL IA: NEGATIVE
SODIUM SERPL-SCNC: 143 MMOL/L
T4 FREE SERPL-MCNC: 1.2 NG/DL
TRIGL SERPL-MCNC: 112 MG/DL
TSH SERPL-ACNC: 1.26 UIU/ML
WBC # FLD AUTO: 7.24 K/UL

## 2020-10-29 ENCOUNTER — LABORATORY RESULT (OUTPATIENT)
Age: 65
End: 2020-10-29

## 2020-11-04 NOTE — HEALTH RISK ASSESSMENT
[1 or 2 (0 pts)] : 1 or 2 (0 points) [Never (0 pts)] : Never (0 points) [Patient reported mammogram was normal] : Patient reported mammogram was normal [Patient declined colonoscopy] : Patient declined colonoscopy [None] : None [# of Members in Household ___] :  household currently consist of [unfilled] member(s) [Good] : ~his/her~  mood as  good [] : Yes [30 or more] : 30 or more [No] : No [No falls in past year] : Patient reported no falls in the past year [0] : 2) Feeling down, depressed, or hopeless: Not at all (0) [HIV test declined] : HIV test declined [Hepatitis C test declined] : Hepatitis C test declined [Health Literacy] : health literacy [With Family] : lives with family [Employed] : employed [High School] : high school [Single] : single [Feels Safe at Home] : Feels safe at home [Fully functional (bathing, dressing, toileting, transferring, walking, feeding)] : Fully functional (bathing, dressing, toileting, transferring, walking, feeding) [Fully functional (using the telephone, shopping, preparing meals, housekeeping, doing laundry, using] : Fully functional and needs no help or supervision to perform IADLs (using the telephone, shopping, preparing meals, housekeeping, doing laundry, using transportation, managing medications and managing finances) [Smoke Detector] : smoke detector [Carbon Monoxide Detector] : carbon monoxide detector [Safety elements used in home] : safety elements used in home [Seat Belt] :  uses seat belt [Sunscreen] : uses sunscreen [de-identified] : Presently less than a pack a day [Reports normal functional visual acuity (ie: able to read med bottle)] : Reports poor functional visual acuity.  [Travel to Developing Areas] : does not  travel to developing areas [MammogramDate] : 2017 [ColonoscopyDate] : Never [de-identified] : Former alcoholic [Audit-CScore] : 0 [de-identified] : Walking [de-identified] : Healthy [[id number]] : 0 [Change in mental status noted] : No change in mental status noted [Language] : denies difficulty with language [Behavior] : denies difficulty with behavior [Learning/Retaining New Information] : denies difficulty learning/retaining new information [Handling Complex Tasks] : denies difficulty handling complex tasks [Reasoning] : denies difficulty with reasoning [Spatial Ability and Orientation] : denies difficulty with spatial ability and orientation [Sexually Active] : not sexually active [High Risk Behavior] : no high risk behavior [Reports changes in hearing] : Reports no changes in hearing [Reports changes in vision] : Reports no changes in vision [Reports changes in dental health] : Reports no changes in dental health [Guns at Home] : no guns at home [TB Exposure] : is not being exposed to tuberculosis [Caregiver Concerns] : does not have caregiver concerns

## 2020-11-04 NOTE — ASSESSMENT
[FreeTextEntry1] : Patient is a 65-year-old female with history of former alcohol abuser, former drug user, hepatitis C status post therapy undetectable, hypothyroidism, hypertension, thyroid nodule, vitamin D deficiency, cervical radiculopathy, who presents for comprehensive annual physical exam who complains of right thumb trigger finger\par \par 1 right trigger finger\par rest splinting, stretching Advil eyes\par if fails to improve referral to hand surgeon\par \par 2. Hypertension\par restart hydrochlorothiazide 25mg Q day\par no LVH and EKG\par \par 3. History of hep C\par check LFTs hep C antibody\par \par 4. Prediabetes\par check hemoglobin A one fasting glucose\par \par 5 hypothyroidism\par continue [id number] qd \par check TFTs\par \par 6. Thyroid nodule\par recheck sonogram of the thyroid\par \par 7 greater than 30 pack year history/pulmonary nodule\par check CT low-dose\par \par 8. Health maintenance\par mammogram\par consider referral to colonoscopy\par flu shot today\par requesting COVID-19 check routine labs\par thyroid sonogram\par pulmonary low-dose CT\par follow-up in one to two months

## 2020-11-04 NOTE — HISTORY OF PRESENT ILLNESS
[FreeTextEntry1] : Comprehensive annual physical exam [de-identified] : Patient is a 65-year-old female with history of former alcohol abuse of former drug abuser, smoker current, hypertension, hypothyroidism, thyroid nodule, pulmonary nodule, prediabetes, cervical radiculopathy, history of chronic hep C treated, who presents for comprehensive annual physical examination patient complains of right hand thumb trigger finger stuck at times denies chest pain, shortness of breath extra exertion palpitation lightheadedness hasn't taken hydrochlorothiazide for the past five days denies headache, dizziness, chest pain, nausea vomiting, constipation

## 2020-11-04 NOTE — ASSESSMENT
[FreeTextEntry1] : Patient is a 65-year-old female with history of former alcohol abuser, former drug user, hepatitis C status post therapy undetectable, hypothyroidism, hypertension, thyroid nodule, vitamin D deficiency, cervical radiculopathy, who presents for comprehensive annual physical exam who complains of right thumb trigger finger\par \par 1 right trigger finger\par rest splinting, stretching Advil eyes\par if fails to improve referral to hand surgeon\par \par 2. Hypertension\par restart hydrochlorothiazide 25mg Q day\par no LVH and EKG\par \par 3. History of hep C\par check LFTs hep C antibody\par \par 4. Prediabetes\par check hemoglobin A one fasting glucose\par \par 5 hypothyroidism\par continue [id number] qd \par check TFTs\par \par 6. Thyroid nodule\par recheck sonogram of the thyroid\par \par 7 greater than 30 pack year history/pulmonary nodule\par check CT low-dose\par \par 8. Health maintenance\par mammogram\par consider referral to colonoscopy\par flu shot today\par requesting COVID-19 check routine labs\par thyroid sonogram\par pulmonary low-dose CT\par follow-up in one to two months

## 2020-11-04 NOTE — HEALTH RISK ASSESSMENT
[1 or 2 (0 pts)] : 1 or 2 (0 points) [Never (0 pts)] : Never (0 points) [Patient reported mammogram was normal] : Patient reported mammogram was normal [Patient declined colonoscopy] : Patient declined colonoscopy [None] : None [# of Members in Household ___] :  household currently consist of [unfilled] member(s) [Good] : ~his/her~  mood as  good [] : Yes [30 or more] : 30 or more [No] : No [No falls in past year] : Patient reported no falls in the past year [0] : 2) Feeling down, depressed, or hopeless: Not at all (0) [HIV test declined] : HIV test declined [Hepatitis C test declined] : Hepatitis C test declined [Health Literacy] : health literacy [With Family] : lives with family [Employed] : employed [High School] : high school [Single] : single [Feels Safe at Home] : Feels safe at home [Fully functional (bathing, dressing, toileting, transferring, walking, feeding)] : Fully functional (bathing, dressing, toileting, transferring, walking, feeding) [Fully functional (using the telephone, shopping, preparing meals, housekeeping, doing laundry, using] : Fully functional and needs no help or supervision to perform IADLs (using the telephone, shopping, preparing meals, housekeeping, doing laundry, using transportation, managing medications and managing finances) [Smoke Detector] : smoke detector [Carbon Monoxide Detector] : carbon monoxide detector [Safety elements used in home] : safety elements used in home [Seat Belt] :  uses seat belt [Sunscreen] : uses sunscreen [de-identified] : Presently less than a pack a day [Reports normal functional visual acuity (ie: able to read med bottle)] : Reports poor functional visual acuity.  [Travel to Developing Areas] : does not  travel to developing areas [MammogramDate] : 2017 [ColonoscopyDate] : Never [de-identified] : Former alcoholic [Audit-CScore] : 0 [de-identified] : Walking [de-identified] : Healthy [[id number]] : 0 [Change in mental status noted] : No change in mental status noted [Language] : denies difficulty with language [Behavior] : denies difficulty with behavior [Learning/Retaining New Information] : denies difficulty learning/retaining new information [Handling Complex Tasks] : denies difficulty handling complex tasks [Reasoning] : denies difficulty with reasoning [Spatial Ability and Orientation] : denies difficulty with spatial ability and orientation [Sexually Active] : not sexually active [High Risk Behavior] : no high risk behavior [Reports changes in hearing] : Reports no changes in hearing [Reports changes in vision] : Reports no changes in vision [Reports changes in dental health] : Reports no changes in dental health [Guns at Home] : no guns at home [TB Exposure] : is not being exposed to tuberculosis [Caregiver Concerns] : does not have caregiver concerns

## 2020-11-05 ENCOUNTER — RX RENEWAL (OUTPATIENT)
Age: 65
End: 2020-11-05

## 2021-01-28 ENCOUNTER — APPOINTMENT (OUTPATIENT)
Dept: INTERNAL MEDICINE | Facility: CLINIC | Age: 66
End: 2021-01-28
Payer: COMMERCIAL

## 2021-01-28 VITALS
DIASTOLIC BLOOD PRESSURE: 78 MMHG | WEIGHT: 152 LBS | SYSTOLIC BLOOD PRESSURE: 149 MMHG | HEIGHT: 69 IN | BODY MASS INDEX: 22.51 KG/M2 | HEART RATE: 72 BPM | OXYGEN SATURATION: 98 %

## 2021-01-28 VITALS — SYSTOLIC BLOOD PRESSURE: 144 MMHG | DIASTOLIC BLOOD PRESSURE: 86 MMHG

## 2021-01-28 PROCEDURE — 99072 ADDL SUPL MATRL&STAF TM PHE: CPT

## 2021-01-28 PROCEDURE — 99214 OFFICE O/P EST MOD 30 MIN: CPT

## 2021-01-28 NOTE — ASSESSMENT
[FreeTextEntry1] : Patient is a 65-year-old female with history of former drug alcohol abuser, history of hep C status post therapy undetectable, prediabetes hypertension hypothyroidism vitamin D insufficiency, thyroid nodule, pulmonary nodule who presents for follow-up\par \par 1. Joint pain\par no joint deformities will check LANEY rheumatoid factor and C-reactive protein\par rule out inflammatory arthropathy versus [place]\par \par 2. hypertension\par borderline control on hydrochlorothiazide 25\par observe\par \par 3/. Hypothyroidism\par check TFTs on the voxel hundred micrograms a day\par \par 4 smoking/pulmonary nodule\par  on cessation has a greater than 30 pack year history\par check CT pulmonology will stability\par counseled on sensation\par \par 5 history of thyroid nodules\par check sonogram for stability\par \par 6 history of chronic hep C\par status post therapy undetectable check LFTs.\par \par Discuss getting colonoscopy with

## 2021-01-28 NOTE — REVIEW OF SYSTEMS
[Joint Pain] : joint pain [Negative] : Gastrointestinal [Joint Stiffness] : no joint stiffness [Joint Swelling] : no joint swelling [Muscle Weakness] : no muscle weakness [Muscle Pain] : no muscle pain [Back Pain] : no back pain

## 2021-01-28 NOTE — HISTORY OF PRESENT ILLNESS
[FreeTextEntry1] : Follow-up for hypertension hypothyroidism smoking [de-identified] : Patient is a 65-year-old female with long history of chronic hep C status post treatment undetectable, hypertension, hypothyroidism, former drug and alcohol abuser, history of pulmonary nodules, thyroid nodule, vitamin D insufficiency and continues to smoke who presents for follow-up patient complains of diffuse achy joints in the elbows and hands occasionally needs denies breath, fever, chills, chest pain, shortness of breath no COVID-19 symptoms otherwise feels well

## 2021-01-29 ENCOUNTER — RX RENEWAL (OUTPATIENT)
Age: 66
End: 2021-01-29

## 2021-02-03 ENCOUNTER — NON-APPOINTMENT (OUTPATIENT)
Age: 66
End: 2021-02-03

## 2021-02-03 ENCOUNTER — APPOINTMENT (OUTPATIENT)
Dept: THORACIC SURGERY | Facility: CLINIC | Age: 66
End: 2021-02-03
Payer: COMMERCIAL

## 2021-02-03 VITALS — HEIGHT: 69 IN | WEIGHT: 152 LBS | BODY MASS INDEX: 22.51 KG/M2

## 2021-02-03 DIAGNOSIS — Z12.2 ENCOUNTER FOR SCREENING FOR MALIGNANT NEOPLASM OF RESPIRATORY ORGANS: ICD-10-CM

## 2021-02-03 PROCEDURE — G0296 VISIT TO DETERM LDCT ELIG: CPT

## 2021-02-03 PROCEDURE — 99072 ADDL SUPL MATRL&STAF TM PHE: CPT

## 2021-02-04 ENCOUNTER — APPOINTMENT (OUTPATIENT)
Dept: CT IMAGING | Facility: IMAGING CENTER | Age: 66
End: 2021-02-04

## 2021-02-04 ENCOUNTER — APPOINTMENT (OUTPATIENT)
Dept: ULTRASOUND IMAGING | Facility: IMAGING CENTER | Age: 66
End: 2021-02-04
Payer: COMMERCIAL

## 2021-02-04 ENCOUNTER — OUTPATIENT (OUTPATIENT)
Dept: OUTPATIENT SERVICES | Facility: HOSPITAL | Age: 66
LOS: 1 days | End: 2021-02-04
Payer: COMMERCIAL

## 2021-02-04 ENCOUNTER — APPOINTMENT (OUTPATIENT)
Dept: CT IMAGING | Facility: IMAGING CENTER | Age: 66
End: 2021-02-04
Payer: COMMERCIAL

## 2021-02-04 DIAGNOSIS — F17.200 NICOTINE DEPENDENCE, UNSPECIFIED, UNCOMPLICATED: ICD-10-CM

## 2021-02-04 PROCEDURE — 76536 US EXAM OF HEAD AND NECK: CPT | Mod: 26

## 2021-02-04 PROCEDURE — 71271 CT THORAX LUNG CANCER SCR C-: CPT

## 2021-02-04 PROCEDURE — 76536 US EXAM OF HEAD AND NECK: CPT

## 2021-02-04 PROCEDURE — 71271 CT THORAX LUNG CANCER SCR C-: CPT | Mod: 26

## 2021-02-08 NOTE — PLAN
[FreeTextEntry1] : Her LDCT is scheduled for 2/4/21 at the [facility] location. She will follow up with Dr. He for the results.

## 2021-02-08 NOTE — HISTORY OF PRESENT ILLNESS
[TextBox_13] : She denies hemoptysis, denies new cough, denies unexplained weight loss.\par She is a current smoker with a 40 pack year smoking history (1PPD x 40 years).  She denies family h/o lung cancer.  She is referred by Dr. Jim He.

## 2021-02-10 ENCOUNTER — NON-APPOINTMENT (OUTPATIENT)
Age: 66
End: 2021-02-10

## 2021-02-11 ENCOUNTER — APPOINTMENT (OUTPATIENT)
Dept: INTERVENTIONAL RADIOLOGY/VASCULAR | Facility: CLINIC | Age: 66
End: 2021-02-11
Payer: COMMERCIAL

## 2021-02-11 DIAGNOSIS — Z63.5 DISRUPTION OF FAMILY BY SEPARATION AND DIVORCE: ICD-10-CM

## 2021-02-11 DIAGNOSIS — F17.200 NICOTINE DEPENDENCE, UNSPECIFIED, UNCOMPLICATED: ICD-10-CM

## 2021-02-11 DIAGNOSIS — Z87.898 PERSONAL HISTORY OF OTHER SPECIFIED CONDITIONS: ICD-10-CM

## 2021-02-11 DIAGNOSIS — F10.21 ALCOHOL DEPENDENCE, IN REMISSION: ICD-10-CM

## 2021-02-11 LAB
ALBUMIN SERPL ELPH-MCNC: 4.4 G/DL
ALP BLD-CCNC: 60 U/L
ALT SERPL-CCNC: 9 U/L
ANA PAT FLD IF-IMP: ABNORMAL
ANA PATTERN: ABNORMAL
ANA SER IF-ACNC: ABNORMAL
ANA TITER: ABNORMAL
ANION GAP SERPL CALC-SCNC: 13 MMOL/L
AST SERPL-CCNC: 12 U/L
BILIRUB SERPL-MCNC: 0.4 MG/DL
BUN SERPL-MCNC: 15 MG/DL
CALCIUM SERPL-MCNC: 10.1 MG/DL
CCP AB SER IA-ACNC: <8 UNITS
CHLORIDE SERPL-SCNC: 102 MMOL/L
CO2 SERPL-SCNC: 24 MMOL/L
CREAT SERPL-MCNC: 0.87 MG/DL
CRP SERPL-MCNC: <0.1 MG/DL
GLUCOSE SERPL-MCNC: 83 MG/DL
POTASSIUM SERPL-SCNC: 4.1 MMOL/L
PROT SERPL-MCNC: 8 G/DL
RF+CCP IGG SER-IMP: NEGATIVE
RHEUMATOID FACT SER QL: <10 IU/ML
SODIUM SERPL-SCNC: 139 MMOL/L
TSH SERPL-ACNC: 0.71 UIU/ML

## 2021-02-11 PROCEDURE — 99243 OFF/OP CNSLTJ NEW/EST LOW 30: CPT | Mod: GT

## 2021-02-11 RX ORDER — ALBUTEROL SULFATE 90 UG/1
108 (90 BASE) AEROSOL, METERED RESPIRATORY (INHALATION) EVERY 6 HOURS
Qty: 1 | Refills: 3 | Status: DISCONTINUED | COMMUNITY
Start: 2017-11-07 | End: 2021-02-11

## 2021-02-11 SDOH — SOCIAL STABILITY - SOCIAL INSECURITY: DISRUPTION OF FAMILY BY SEPARATION AND DIVORCE: Z63.5

## 2021-02-23 ENCOUNTER — TRANSCRIPTION ENCOUNTER (OUTPATIENT)
Age: 66
End: 2021-02-23

## 2021-03-12 ENCOUNTER — OUTPATIENT (OUTPATIENT)
Dept: OUTPATIENT SERVICES | Facility: HOSPITAL | Age: 66
LOS: 1 days | End: 2021-03-12
Payer: COMMERCIAL

## 2021-03-12 DIAGNOSIS — Z11.52 ENCOUNTER FOR SCREENING FOR COVID-19: ICD-10-CM

## 2021-03-12 LAB
BASOPHILS # BLD AUTO: 0.06 K/UL
BASOPHILS NFR BLD AUTO: 1 %
EOSINOPHIL # BLD AUTO: 0.44 K/UL
EOSINOPHIL NFR BLD AUTO: 7 %
HCT VFR BLD CALC: 46.3 %
HGB BLD-MCNC: 14.7 G/DL
IMM GRANULOCYTES NFR BLD AUTO: 0.2 %
INR PPP: 1.06 RATIO
LYMPHOCYTES # BLD AUTO: 2.44 K/UL
LYMPHOCYTES NFR BLD AUTO: 38.7 %
MAN DIFF?: NORMAL
MCHC RBC-ENTMCNC: 29.7 PG
MCHC RBC-ENTMCNC: 31.7 GM/DL
MCV RBC AUTO: 93.5 FL
MONOCYTES # BLD AUTO: 0.57 K/UL
MONOCYTES NFR BLD AUTO: 9 %
NEUTROPHILS # BLD AUTO: 2.78 K/UL
NEUTROPHILS NFR BLD AUTO: 44.1 %
PLATELET # BLD AUTO: 231 K/UL
PT BLD: 12.6 SEC
RBC # BLD: 4.95 M/UL
RBC # FLD: 12.8 %
WBC # FLD AUTO: 6.3 K/UL

## 2021-03-12 PROCEDURE — U0005: CPT

## 2021-03-12 PROCEDURE — C9803: CPT

## 2021-03-12 PROCEDURE — U0003: CPT

## 2021-03-13 LAB — SARS-COV-2 RNA SPEC QL NAA+PROBE: SIGNIFICANT CHANGE UP

## 2021-03-15 ENCOUNTER — RESULT REVIEW (OUTPATIENT)
Age: 66
End: 2021-03-15

## 2021-03-15 ENCOUNTER — OUTPATIENT (OUTPATIENT)
Dept: OUTPATIENT SERVICES | Facility: HOSPITAL | Age: 66
LOS: 1 days | End: 2021-03-15
Payer: COMMERCIAL

## 2021-03-15 ENCOUNTER — APPOINTMENT (OUTPATIENT)
Dept: CT IMAGING | Facility: HOSPITAL | Age: 66
End: 2021-03-15

## 2021-03-15 VITALS
DIASTOLIC BLOOD PRESSURE: 95 MMHG | OXYGEN SATURATION: 98 % | HEART RATE: 59 BPM | SYSTOLIC BLOOD PRESSURE: 158 MMHG | RESPIRATION RATE: 18 BRPM

## 2021-03-15 VITALS — HEIGHT: 68.9 IN | WEIGHT: 152.12 LBS

## 2021-03-15 DIAGNOSIS — R91.1 SOLITARY PULMONARY NODULE: ICD-10-CM

## 2021-03-15 PROCEDURE — 71045 X-RAY EXAM CHEST 1 VIEW: CPT

## 2021-03-15 PROCEDURE — 88173 CYTOPATH EVAL FNA REPORT: CPT

## 2021-03-15 PROCEDURE — 71045 X-RAY EXAM CHEST 1 VIEW: CPT | Mod: 26

## 2021-03-15 PROCEDURE — 88341 IMHCHEM/IMCYTCHM EA ADD ANTB: CPT

## 2021-03-15 PROCEDURE — 88341 IMHCHEM/IMCYTCHM EA ADD ANTB: CPT | Mod: 26

## 2021-03-15 PROCEDURE — 32408 CORE NDL BX LNG/MED PERQ: CPT

## 2021-03-15 PROCEDURE — 88342 IMHCHEM/IMCYTCHM 1ST ANTB: CPT

## 2021-03-15 PROCEDURE — 88305 TISSUE EXAM BY PATHOLOGIST: CPT | Mod: 26

## 2021-03-15 PROCEDURE — 88305 TISSUE EXAM BY PATHOLOGIST: CPT

## 2021-03-15 PROCEDURE — 88141 CYTOPATH C/V INTERPRET: CPT

## 2021-03-15 PROCEDURE — 88342 IMHCHEM/IMCYTCHM 1ST ANTB: CPT | Mod: 26

## 2021-03-15 PROCEDURE — 88173 CYTOPATH EVAL FNA REPORT: CPT | Mod: 26

## 2021-03-15 NOTE — ASU DISCHARGE PLAN (ADULT/PEDIATRIC) - NURSING INSTRUCTIONS
Please feel free to contact us at [phone] if any problems arise. After 6PM, Monday through Friday, on weekends and on holidays, please call [phone] and ask for the radiology resident on call to be paged.

## 2021-03-15 NOTE — PROGRESS NOTE ADULT - SUBJECTIVE AND OBJECTIVE BOX
Interventional Radiology Brief- Operative Note    Procedure: Lung Biopsy    Operators: Case Russell; Dominic Garza    Anesthesia (type): Per department of anasthesia    Findings/Follow up Plan of Care: Right upper lobe spiculated nodule 2 FNA 22guage biopsies were taken.    Implants: None    Complications: None    Condition/Disposition: Pending CXR may discharge if stable and without pneumothorax.    Please call Interventional Radiology [phone] with any questions, concerns, or issues.

## 2021-03-18 LAB — NON-GYNECOLOGICAL CYTOLOGY STUDY: SIGNIFICANT CHANGE UP

## 2021-03-22 DIAGNOSIS — R91.1 SOLITARY PULMONARY NODULE: ICD-10-CM

## 2021-03-24 ENCOUNTER — TRANSCRIPTION ENCOUNTER (OUTPATIENT)
Age: 66
End: 2021-03-24

## 2021-03-29 ENCOUNTER — APPOINTMENT (OUTPATIENT)
Dept: THORACIC SURGERY | Facility: CLINIC | Age: 66
End: 2021-03-29
Payer: COMMERCIAL

## 2021-03-29 VITALS
OXYGEN SATURATION: 98 % | SYSTOLIC BLOOD PRESSURE: 158 MMHG | BODY MASS INDEX: 21.62 KG/M2 | RESPIRATION RATE: 17 BRPM | DIASTOLIC BLOOD PRESSURE: 89 MMHG | HEART RATE: 75 BPM | TEMPERATURE: 98.8 F | WEIGHT: 146 LBS | HEIGHT: 69 IN

## 2021-03-29 PROCEDURE — 99245 OFF/OP CONSLTJ NEW/EST HI 55: CPT

## 2021-03-29 PROCEDURE — 99072 ADDL SUPL MATRL&STAF TM PHE: CPT

## 2021-03-29 NOTE — DATA REVIEWED
[FreeTextEntry1] : I have independently reviewed patient's CT Chest on 2/11/21 and path of CT-guided FNA of RUL nodule, and have indicated my interpretations in my plan below:\par - increasing size 1.7cm spiculated RUL nodule (previously 0.8cm)\par - fissural triangular density adjacent to this nodule is stable and likely representing pulm LN\par - another fissural triangular nodule w/in the LLL (3:94), also representing pulm LN\par \par CT-guided FNA of RUL nodule on 3/15/21. Path revealed AdenoCA, lung primary.

## 2021-03-29 NOTE — HISTORY OF PRESENT ILLNESS
[FreeTextEntry1] : Ms. DIPIKA ROTH, 66 year old female, current smoker, hx of drug abuse and ETOH abuse (sober x 30yrs)w/ hx of HTN, pre-DM2, hypothyroidism, Hep C s/p Harvoni, COPD/Emphysema, and chronic lung nodules (found during lung CA screening CT due to smoking hx).\par \par CT Chest on 2/11/21:\par - increasing size 1.7cm spiculated RUL nodule (previously 0.8cm)\par - fissural triangular density adjacent to this nodule is stable and likely representing pulm LN\par - another fissural triangular nodule w/in the LLL (3:94), also representing pulm LN\par \par CT-guided FNA of RUL nodule on 3/15/21. Path revealed AdenoCA, lung primary.\par \par Patient is here today for CT Sx consultation, referred by PCP Dr. He.

## 2021-03-29 NOTE — ASSESSMENT
[FreeTextEntry1] : Ms. DIPIKA ROTH, 66 year old female, current smoker, hx of drug abuse and ETOH abuse (sober x 30yrs)w/ hx of HTN, pre-DM2, hypothyroidism, Hep C s/p Harvoni, COPD/Emphysema, and chronic lung nodules (found during lung CA screening CT due to smoking hx).\par \par CT Chest on 2/11/21:\par - increasing size 1.7cm spiculated RUL nodule (previously 0.8cm)\par - fissural triangular density adjacent to this nodule is stable and likely representing pulm LN\par - another fissural triangular nodule w/in the LLL (3:94), also representing pulm LN\par \par CT-guided FNA of RUL nodule on 3/15/21. Path revealed AdenoCA, lung primary.\par \par I have reviewed the patient's medical records and diagnostic images at time of this office consultation and have made the following recommendation:\par 1. CT scan and Path reviewed, biopsy-proven AdenoCA, I recommended a Rt VATS Robotic-assisted, RULobectomy, MLND on 4/21/21. Risks and benefits and alternatives explained to patient, all questions answered, patient agreed to proceed with surgery.\par 2. PET/CT \par 3. PFTs\par 4. Brain MRI w/w/o contrast\par 5. Medical clearance, PST and COVID testing prior.\par 6. Smoking cessation, ePrescribed Nicotine patch 21mg x 21 days, will then taper down to 14mg \par \par \par I personally performed the services described in the documentation, reviewed the documentation recorded by the scribe in my presence and it accurately and completely records my words and actions.\par \par I, Christelle Kim NP, am scribing for and the presence of SHREE Bates, the following sections HISTORY OF PRESENT ILLNESS, PAST MEDICAL/FAMILY/SOCIAL HISTORY; REVIEW OF SYSTEMS; VITAL SIGNS; PHYSICAL EXAM; DISPOSITION.\par \par

## 2021-03-29 NOTE — CONSULT LETTER
[Consult Letter:] : I had the pleasure of evaluating your patient, [unfilled]. [( Thank you for referring [unfilled] for consultation for _____ )] : Thank you for referring [unfilled] for consultation for [unfilled] [Please see my note below.] : Please see my note below. [Consult Closing:] : Thank you very much for allowing me to participate in the care of this patient.  If you have any questions, please do not hesitate to contact me. [Sincerely,] : Sincerely, [FreeTextEntry2] : Dr. Jim He (PCP/referring) [FreeTextEntry3] : Willie Adam MD, MPH \par System Director of Thoracic Surgery \par Director of Comprehensive Lung and Foregut [place] \par Professor Cardiovascular & Thoracic Surgery  \par [facility] [employer] [facility]\par \par Health system\par [address]\par Oncology [address]\[place] [address]\par Tel: [phone]\par Fax: [phone]\par

## 2021-04-04 ENCOUNTER — APPOINTMENT (OUTPATIENT)
Dept: DISASTER EMERGENCY | Facility: CLINIC | Age: 66
End: 2021-04-04

## 2021-04-04 LAB — SARS-COV-2 N GENE NPH QL NAA+PROBE: NOT DETECTED

## 2021-04-07 ENCOUNTER — OUTPATIENT (OUTPATIENT)
Dept: OUTPATIENT SERVICES | Facility: HOSPITAL | Age: 66
LOS: 1 days | End: 2021-04-07
Payer: COMMERCIAL

## 2021-04-07 VITALS
RESPIRATION RATE: 16 BRPM | HEART RATE: 82 BPM | OXYGEN SATURATION: 98 % | SYSTOLIC BLOOD PRESSURE: 145 MMHG | TEMPERATURE: 98 F | DIASTOLIC BLOOD PRESSURE: 94 MMHG

## 2021-04-07 DIAGNOSIS — Z98.890 OTHER SPECIFIED POSTPROCEDURAL STATES: Chronic | ICD-10-CM

## 2021-04-07 DIAGNOSIS — C34.90 MALIGNANT NEOPLASM OF UNSPECIFIED PART OF UNSPECIFIED BRONCHUS OR LUNG: ICD-10-CM

## 2021-04-07 LAB
ALBUMIN SERPL ELPH-MCNC: 4.6 G/DL — SIGNIFICANT CHANGE UP (ref 3.3–5)
ALP SERPL-CCNC: 64 U/L — SIGNIFICANT CHANGE UP (ref 40–120)
ALT FLD-CCNC: 14 U/L — SIGNIFICANT CHANGE UP (ref 4–33)
ANION GAP SERPL CALC-SCNC: 13 MMOL/L — SIGNIFICANT CHANGE UP (ref 7–14)
AST SERPL-CCNC: 21 U/L — SIGNIFICANT CHANGE UP (ref 4–32)
BILIRUB SERPL-MCNC: 0.3 MG/DL — SIGNIFICANT CHANGE UP (ref 0.2–1.2)
BLD GP AB SCN SERPL QL: NEGATIVE — SIGNIFICANT CHANGE UP
BUN SERPL-MCNC: 13 MG/DL — SIGNIFICANT CHANGE UP (ref 7–23)
CALCIUM SERPL-MCNC: 10.1 MG/DL — SIGNIFICANT CHANGE UP (ref 8.4–10.5)
CHLORIDE SERPL-SCNC: 102 MMOL/L — SIGNIFICANT CHANGE UP (ref 98–107)
CO2 SERPL-SCNC: 27 MMOL/L — SIGNIFICANT CHANGE UP (ref 22–31)
CREAT SERPL-MCNC: 0.72 MG/DL — SIGNIFICANT CHANGE UP (ref 0.5–1.3)
GLUCOSE SERPL-MCNC: 72 MG/DL — SIGNIFICANT CHANGE UP (ref 70–99)
HCT VFR BLD CALC: 47.8 % — HIGH (ref 34.5–45)
HGB BLD-MCNC: 15.6 G/DL — HIGH (ref 11.5–15.5)
MCHC RBC-ENTMCNC: 30.2 PG — SIGNIFICANT CHANGE UP (ref 27–34)
MCHC RBC-ENTMCNC: 32.6 GM/DL — SIGNIFICANT CHANGE UP (ref 32–36)
MCV RBC AUTO: 92.5 FL — SIGNIFICANT CHANGE UP (ref 80–100)
NRBC # BLD: 0 /100 WBCS — SIGNIFICANT CHANGE UP
NRBC # FLD: 0 K/UL — SIGNIFICANT CHANGE UP
PLATELET # BLD AUTO: 311 K/UL — SIGNIFICANT CHANGE UP (ref 150–400)
POTASSIUM SERPL-MCNC: 4 MMOL/L — SIGNIFICANT CHANGE UP (ref 3.5–5.3)
POTASSIUM SERPL-SCNC: 4 MMOL/L — SIGNIFICANT CHANGE UP (ref 3.5–5.3)
PROT SERPL-MCNC: 9.2 G/DL — HIGH (ref 6–8.3)
RBC # BLD: 5.17 M/UL — SIGNIFICANT CHANGE UP (ref 3.8–5.2)
RBC # FLD: 12.8 % — SIGNIFICANT CHANGE UP (ref 10.3–14.5)
RH IG SCN BLD-IMP: POSITIVE — SIGNIFICANT CHANGE UP
SODIUM SERPL-SCNC: 142 MMOL/L — SIGNIFICANT CHANGE UP (ref 135–145)
WBC # BLD: 7.12 K/UL — SIGNIFICANT CHANGE UP (ref 3.8–10.5)
WBC # FLD AUTO: 7.12 K/UL — SIGNIFICANT CHANGE UP (ref 3.8–10.5)

## 2021-04-07 PROCEDURE — 93010 ELECTROCARDIOGRAM REPORT: CPT

## 2021-04-07 RX ORDER — SODIUM CHLORIDE 9 MG/ML
1000 INJECTION, SOLUTION INTRAVENOUS
Refills: 0 | Status: DISCONTINUED | OUTPATIENT
Start: 2021-04-21 | End: 2021-04-23

## 2021-04-07 NOTE — H&P PST ADULT - NSANTHOSAYNRD_GEN_A_CORE
Denies sleep studies done in the past/No. OMER screening performed.  STOP BANG Legend: 0-2 = LOW Risk; 3-4 = INTERMEDIATE Risk; 5-8 = HIGH Risk

## 2021-04-07 NOTE — H&P PST ADULT - ASSESSMENT
DX: malignant neoplasm of unspecified part of unspecified bronchus or lung and evaluated for a scheduled right VATS, robotic assisted right upper lobectomy, mediastinal LN dissection on 4/21/21.

## 2021-04-07 NOTE — H&P PST ADULT - HISTORY OF PRESENT ILLNESS
65 y/o F presents to PST w/ a preop dx of malignant neoplasm of unspecified part of unspecified bronchus or lung and to be evaluated for a scheduled right VATS, robotic assisted right upper lobectomy, mediastinal LN dissection on 4/21/21. Pt states I need lung sx. Pt is a poor historian. States " I am having a lung sx, I have a 1.7 lesion that will be removed so cancer don't spread". Pt had a CT scan as per PCP due to smoking hx. Pt states + cough for few years.

## 2021-04-07 NOTE — H&P PST ADULT - ENDOCRINE COMMENTS
"I am a full blown alcoholic and I need detox. somebody needs to save me from me, I need help, I am feeling a little suicidal and I don't really think I want to hurt myself I really want help."  Pt in pjs and smells of smoke.
tsh wnl, denies any issues, no regimen changes

## 2021-04-07 NOTE — H&P PST ADULT - COMMENTS
Denies current covid S&S or in the past, test neg when done. Pt denies history of travel outside the country and outside [place] and denies COVID19 positive contacts within the last 14 days. 2019

## 2021-04-07 NOTE — H&P PST ADULT - RS GEN PE MLT RESP DETAILS PC
airway patent/breath sounds equal/no chest wall tenderness/no intercostal retractions/no rales/no rhonchi/no subcutaneous emphysema/no wheezes

## 2021-04-08 ENCOUNTER — APPOINTMENT (OUTPATIENT)
Dept: PULMONOLOGY | Facility: CLINIC | Age: 66
End: 2021-04-08
Payer: COMMERCIAL

## 2021-04-08 ENCOUNTER — APPOINTMENT (OUTPATIENT)
Dept: INTERNAL MEDICINE | Facility: CLINIC | Age: 66
End: 2021-04-08
Payer: COMMERCIAL

## 2021-04-08 VITALS — WEIGHT: 150 LBS | HEART RATE: 76 BPM | TEMPERATURE: 97.5 F | BODY MASS INDEX: 21.47 KG/M2 | HEIGHT: 70 IN

## 2021-04-08 VITALS
BODY MASS INDEX: 21.76 KG/M2 | OXYGEN SATURATION: 99 % | SYSTOLIC BLOOD PRESSURE: 135 MMHG | WEIGHT: 152 LBS | DIASTOLIC BLOOD PRESSURE: 80 MMHG | TEMPERATURE: 97.1 F | HEART RATE: 74 BPM | HEIGHT: 70 IN

## 2021-04-08 DIAGNOSIS — Z87.898 PERSONAL HISTORY OF OTHER SPECIFIED CONDITIONS: ICD-10-CM

## 2021-04-08 DIAGNOSIS — Z92.29 PERSONAL HISTORY OF OTHER DRUG THERAPY: ICD-10-CM

## 2021-04-08 DIAGNOSIS — R91.1 SOLITARY PULMONARY NODULE: ICD-10-CM

## 2021-04-08 DIAGNOSIS — Z11.59 ENCOUNTER FOR SCREENING FOR OTHER VIRAL DISEASES: ICD-10-CM

## 2021-04-08 DIAGNOSIS — Z01.818 ENCOUNTER FOR OTHER PREPROCEDURAL EXAMINATION: ICD-10-CM

## 2021-04-08 DIAGNOSIS — R79.9 ABNORMAL FINDING OF BLOOD CHEMISTRY, UNSPECIFIED: ICD-10-CM

## 2021-04-08 PROCEDURE — 94726 PLETHYSMOGRAPHY LUNG VOLUMES: CPT

## 2021-04-08 PROCEDURE — 94010 BREATHING CAPACITY TEST: CPT

## 2021-04-08 PROCEDURE — 99072 ADDL SUPL MATRL&STAF TM PHE: CPT

## 2021-04-08 PROCEDURE — 99215 OFFICE O/P EST HI 40 MIN: CPT

## 2021-04-08 PROCEDURE — 94729 DIFFUSING CAPACITY: CPT

## 2021-04-08 NOTE — ASSESSMENT
[Patient Optimized for Surgery] : Patient optimized for surgery [No Further Testing Recommended] : no further testing recommended [As per surgery] : as per surgery [FreeTextEntry4] : Patient is a 66-year-old female with long history of smoking, history of drug abuse, history of alcohol abuse, hypertension, hypothyroidism, prediabetes, history of treated hep C, who presents for preoperative evaluation for resection of right upper lobe for adenocarcinoma of the lung\par \par 1 preoperative valuation\par patient has no contraindication to local or general anesthesia and is low risk for this moderate risk surgery. Patient has good exercise tolerance without symptoms, patient is optimized for surgery.\par Await pet scan and MRI of the brain\par \par 2 hypertension\par controlled on hydrochlorothiazide 25 mg PO Q day may hold morning of surgery\par \par 3. Hypothyroidism\par continue levothyroxine hundred micrograms morning of surgery with a sip of water\par \par 4. History of hep C\par that is status post  Harvoni in remission\par \par 5. Smoking\par patient started on patches nicotine\par \par 6 history of drug and alcohol abuse\par patient fearful of using narcotics post surgery.\par \par 7 history of emphysema\par no evidence of shortness of breath coughing doesn't use albuterol inhaler

## 2021-04-08 NOTE — CURRENT MEDS
[Takes medication as prescribed] : takes [No] : Did not review medication list for presence of high-risk medications.

## 2021-04-08 NOTE — HISTORY OF PRESENT ILLNESS
[No Pertinent Cardiac History] : no history of aortic stenosis, atrial fibrillation, coronary artery disease, recent myocardial infarction, or implantable device/pacemaker [Asthma] : no asthma [COPD] : COPD [Sleep Apnea] : no sleep apnea [No Adverse Anesthesia Reaction] : no adverse anesthesia reaction in self or family member [Chronic Anticoagulation] : no chronic anticoagulation [Chronic Kidney Disease] : no chronic kidney disease [Diabetes] : no diabetes [FreeTextEntry1] : Right upper lobe lobectomy for cancer [FreeTextEntry2] : April 21, 2021 [FreeTextEntry3] : Dr. Adam [FreeTextEntry4] : Patient is a 66-year-old female with long history of smoking, former alcoholic, former heroin abuser, hypertension, prediabetes, hypothyroidism, history of hep C status post Haroni, COPD/emphysema who presents for preoperative valuation prior to right upper lobe resection for adenocarcinoma of the lung. Patient awaiting pet scan and MRI of the head she denies chest pain, shortness of breath, distant exertion palpitation continues to smoke quarter pack per day but ready to quit. She denies anesthesia reactions nausea vomiting diarrhea dysuria blood clots in the past.

## 2021-04-09 ENCOUNTER — APPOINTMENT (OUTPATIENT)
Dept: MRI IMAGING | Facility: IMAGING CENTER | Age: 66
End: 2021-04-09

## 2021-04-09 ENCOUNTER — APPOINTMENT (OUTPATIENT)
Dept: MRI IMAGING | Facility: CLINIC | Age: 66
End: 2021-04-09

## 2021-04-09 ENCOUNTER — APPOINTMENT (OUTPATIENT)
Dept: NUCLEAR MEDICINE | Facility: IMAGING CENTER | Age: 66
End: 2021-04-09
Payer: COMMERCIAL

## 2021-04-09 ENCOUNTER — OUTPATIENT (OUTPATIENT)
Dept: OUTPATIENT SERVICES | Facility: HOSPITAL | Age: 66
LOS: 1 days | End: 2021-04-09
Payer: COMMERCIAL

## 2021-04-09 DIAGNOSIS — Z98.890 OTHER SPECIFIED POSTPROCEDURAL STATES: Chronic | ICD-10-CM

## 2021-04-09 DIAGNOSIS — C34.90 MALIGNANT NEOPLASM OF UNSPECIFIED PART OF UNSPECIFIED BRONCHUS OR LUNG: ICD-10-CM

## 2021-04-09 PROBLEM — I10 ESSENTIAL (PRIMARY) HYPERTENSION: Chronic | Status: ACTIVE | Noted: 2021-04-07

## 2021-04-09 PROBLEM — E03.9 HYPOTHYROIDISM, UNSPECIFIED: Chronic | Status: ACTIVE | Noted: 2021-04-07

## 2021-04-09 PROCEDURE — 78815 PET IMAGE W/CT SKULL-THIGH: CPT | Mod: 26,PI

## 2021-04-12 ENCOUNTER — RESULT REVIEW (OUTPATIENT)
Age: 66
End: 2021-04-12

## 2021-04-12 PROCEDURE — 70553 MRI BRAIN STEM W/O & W/DYE: CPT | Mod: 26

## 2021-04-12 PROCEDURE — A9552: CPT

## 2021-04-12 PROCEDURE — A9585: CPT

## 2021-04-12 PROCEDURE — 78815 PET IMAGE W/CT SKULL-THIGH: CPT

## 2021-04-12 PROCEDURE — 70553 MRI BRAIN STEM W/O & W/DYE: CPT

## 2021-04-13 ENCOUNTER — APPOINTMENT (OUTPATIENT)
Dept: INTERNAL MEDICINE | Facility: CLINIC | Age: 66
End: 2021-04-13

## 2021-04-18 ENCOUNTER — APPOINTMENT (OUTPATIENT)
Dept: DISASTER EMERGENCY | Facility: CLINIC | Age: 66
End: 2021-04-18

## 2021-04-18 LAB — SARS-COV-2 N GENE NPH QL NAA+PROBE: NOT DETECTED

## 2021-04-20 NOTE — ASU PATIENT PROFILE, ADULT - PAIN LOCATION
n/a 1. I was told the name of the doctor(s) who took care of my child while in the hospital.    2. I have been told about any important findings on my child's plan of care.    3. The doctor clearly explained my child's diagnosis and other possible diagnoses that were considered.    4. My child's doctor explained all the tests that were done and their results (if available). I understand that some of the test results may not be ready before we go home and I was told how I can get these results. I understand that a summary of my child's hospitalization and important test results will be shared with my child's outpatient doctor.    5. My child's doctor talked to me about what I need to do when we go home.    6. I understand what signs and symptoms to watch for. I understand what symptoms I would need to call my doctor for and/or return to the hospital.    7. I have the phone number to call the hospital for results and/or questions after I leave the hospital.

## 2021-04-21 ENCOUNTER — INPATIENT (INPATIENT)
Facility: HOSPITAL | Age: 66
LOS: 5 days | Discharge: HOME HEALTH | End: 2021-04-27
Attending: THORACIC SURGERY (CARDIOTHORACIC VASCULAR SURGERY) | Admitting: THORACIC SURGERY (CARDIOTHORACIC VASCULAR SURGERY)
Payer: COMMERCIAL

## 2021-04-21 ENCOUNTER — APPOINTMENT (OUTPATIENT)
Dept: THORACIC SURGERY | Facility: HOSPITAL | Age: 66
End: 2021-04-21

## 2021-04-21 ENCOUNTER — RESULT REVIEW (OUTPATIENT)
Age: 66
End: 2021-04-21

## 2021-04-21 VITALS
HEIGHT: 69 IN | RESPIRATION RATE: 12 BRPM | WEIGHT: 151.9 LBS | OXYGEN SATURATION: 96 % | HEART RATE: 94 BPM | TEMPERATURE: 98 F | DIASTOLIC BLOOD PRESSURE: 80 MMHG | SYSTOLIC BLOOD PRESSURE: 126 MMHG

## 2021-04-21 DIAGNOSIS — C34.90 MALIGNANT NEOPLASM OF UNSPECIFIED PART OF UNSPECIFIED BRONCHUS OR LUNG: ICD-10-CM

## 2021-04-21 DIAGNOSIS — Z98.890 OTHER SPECIFIED POSTPROCEDURAL STATES: Chronic | ICD-10-CM

## 2021-04-21 PROCEDURE — 88305 TISSUE EXAM BY PATHOLOGIST: CPT | Mod: 26

## 2021-04-21 PROCEDURE — 32674 THORACOSCOPY LYMPH NODE EXC: CPT

## 2021-04-21 PROCEDURE — 32652 THORACOSCOPY REM TOTL CORTEX: CPT | Mod: AS

## 2021-04-21 PROCEDURE — 99233 SBSQ HOSP IP/OBS HIGH 50: CPT

## 2021-04-21 PROCEDURE — 71045 X-RAY EXAM CHEST 1 VIEW: CPT | Mod: 26

## 2021-04-21 PROCEDURE — 32663 THORACOSCOPY W/LOBECTOMY: CPT | Mod: AS,RT

## 2021-04-21 PROCEDURE — 32674 THORACOSCOPY LYMPH NODE EXC: CPT | Mod: AS

## 2021-04-21 PROCEDURE — 88309 TISSUE EXAM BY PATHOLOGIST: CPT | Mod: 26

## 2021-04-21 PROCEDURE — 32652 THORACOSCOPY REM TOTL CORTEX: CPT

## 2021-04-21 PROCEDURE — 32663 THORACOSCOPY W/LOBECTOMY: CPT | Mod: RT

## 2021-04-21 PROCEDURE — S2900 ROBOTIC SURGICAL SYSTEM: CPT | Mod: NC

## 2021-04-21 PROCEDURE — 31622 DX BRONCHOSCOPE/WASH: CPT

## 2021-04-21 RX ORDER — LEVOTHYROXINE SODIUM 125 MCG
100 TABLET ORAL DAILY
Refills: 0 | Status: DISCONTINUED | OUTPATIENT
Start: 2021-04-22 | End: 2021-04-27

## 2021-04-21 RX ORDER — GABAPENTIN 400 MG/1
300 CAPSULE ORAL ONCE
Refills: 0 | Status: COMPLETED | OUTPATIENT
Start: 2021-04-21 | End: 2021-04-21

## 2021-04-21 RX ORDER — NALOXONE HYDROCHLORIDE 4 MG/.1ML
0.1 SPRAY NASAL
Refills: 0 | Status: DISCONTINUED | OUTPATIENT
Start: 2021-04-21 | End: 2021-04-27

## 2021-04-21 RX ORDER — HYDROMORPHONE HYDROCHLORIDE 2 MG/ML
0.5 INJECTION INTRAMUSCULAR; INTRAVENOUS; SUBCUTANEOUS
Refills: 0 | Status: DISCONTINUED | OUTPATIENT
Start: 2021-04-21 | End: 2021-04-23

## 2021-04-21 RX ORDER — ACETAMINOPHEN 500 MG
1000 TABLET ORAL ONCE
Refills: 0 | Status: COMPLETED | OUTPATIENT
Start: 2021-04-21 | End: 2021-04-23

## 2021-04-21 RX ORDER — ACETAMINOPHEN 500 MG
975 TABLET ORAL ONCE
Refills: 0 | Status: COMPLETED | OUTPATIENT
Start: 2021-04-21 | End: 2021-04-21

## 2021-04-21 RX ORDER — ONDANSETRON 8 MG/1
4 TABLET, FILM COATED ORAL EVERY 6 HOURS
Refills: 0 | Status: DISCONTINUED | OUTPATIENT
Start: 2021-04-21 | End: 2021-04-23

## 2021-04-21 RX ORDER — ACETAMINOPHEN 500 MG
1000 TABLET ORAL ONCE
Refills: 0 | Status: COMPLETED | OUTPATIENT
Start: 2021-04-22 | End: 2021-04-25

## 2021-04-21 RX ORDER — HYDROMORPHONE HYDROCHLORIDE 2 MG/ML
30 INJECTION INTRAMUSCULAR; INTRAVENOUS; SUBCUTANEOUS
Refills: 0 | Status: DISCONTINUED | OUTPATIENT
Start: 2021-04-21 | End: 2021-04-23

## 2021-04-21 RX ORDER — HEPARIN SODIUM 5000 [USP'U]/ML
5000 INJECTION INTRAVENOUS; SUBCUTANEOUS ONCE
Refills: 0 | Status: COMPLETED | OUTPATIENT
Start: 2021-04-21 | End: 2021-04-21

## 2021-04-21 RX ORDER — HEPARIN SODIUM 5000 [USP'U]/ML
5000 INJECTION INTRAVENOUS; SUBCUTANEOUS EVERY 8 HOURS
Refills: 0 | Status: DISCONTINUED | OUTPATIENT
Start: 2021-04-21 | End: 2021-04-27

## 2021-04-21 RX ORDER — NALBUPHINE HYDROCHLORIDE 10 MG/ML
2.5 INJECTION, SOLUTION INTRAMUSCULAR; INTRAVENOUS; SUBCUTANEOUS EVERY 6 HOURS
Refills: 0 | Status: DISCONTINUED | OUTPATIENT
Start: 2021-04-21 | End: 2021-04-23

## 2021-04-21 RX ADMIN — HEPARIN SODIUM 5000 UNIT(S): 5000 INJECTION INTRAVENOUS; SUBCUTANEOUS at 09:13

## 2021-04-21 RX ADMIN — GABAPENTIN 300 MILLIGRAM(S): 400 CAPSULE ORAL at 09:12

## 2021-04-21 RX ADMIN — Medication 975 MILLIGRAM(S): at 09:12

## 2021-04-21 RX ADMIN — SODIUM CHLORIDE 30 MILLILITER(S): 9 INJECTION, SOLUTION INTRAVENOUS at 19:11

## 2021-04-21 RX ADMIN — HEPARIN SODIUM 5000 UNIT(S): 5000 INJECTION INTRAVENOUS; SUBCUTANEOUS at 21:39

## 2021-04-21 RX ADMIN — SODIUM CHLORIDE 30 MILLILITER(S): 9 INJECTION, SOLUTION INTRAVENOUS at 16:09

## 2021-04-21 RX ADMIN — HYDROMORPHONE HYDROCHLORIDE 30 MILLILITER(S): 2 INJECTION INTRAMUSCULAR; INTRAVENOUS; SUBCUTANEOUS at 16:10

## 2021-04-21 RX ADMIN — HYDROMORPHONE HYDROCHLORIDE 30 MILLILITER(S): 2 INJECTION INTRAMUSCULAR; INTRAVENOUS; SUBCUTANEOUS at 19:12

## 2021-04-21 NOTE — BRIEF OPERATIVE NOTE - OPERATION/FINDINGS
flex bronch, robot RVATS, pulmonary adhesions and pneumolysis, right upper lobectomy with mediastinal lymph node dissection flex bronch, robot RVATS, pulmonary adhesions and pneumolysis, right upper lobectomy with mediastinal lymph node dissection    [place], Yohan Montoya PA-C, was the first assistant in this operation, including but not limited to docking the da Nic robot, serving as the assistant at the surgical field while the surgeon was at the robot console, and wound closure.

## 2021-04-21 NOTE — ASU PREOP CHECKLIST - SELECT TESTS ORDERED
BMP/CBC/UCG/COVID-19 CBC/CMP/Type and Screen/UCG/COVID-19 CBC/CMP/Type and Cross/Type and Screen/UCG/COVID-19

## 2021-04-21 NOTE — BRIEF OPERATIVE NOTE - NSICDXBRIEFPROCEDURE_GEN_ALL_CORE_FT
PROCEDURES:  Robot-assisted lobectomy of right lung with video-assisted thoracoscopic surgery (VATS) 21-Apr-2021 16:03:20  Nickolas Braden

## 2021-04-21 NOTE — PROGRESS NOTE ADULT - SUBJECTIVE AND OBJECTIVE BOX
DIPIKA ROTH      66y   Female   MRN-[id number]         No Known Allergies             Daily Height in cm: 175.26 (21 Apr 2021 08:46)    Daily Drug Dosing Weight  Height (cm): 175.3 (21 Apr 2021 08:46)  Weight (kg): 68.9 (21 Apr 2021 08:46)  BMI (kg/m2): 22.4 (21 Apr 2021 08:46)  BSA (m2): 1.84 (21 Apr 2021 08:46)    HPI:  67 y/o F presents to PST w/ a preop dx of malignant neoplasm of unspecified part of unspecified bronchus or lung and to be evaluated for a scheduled right VATS, robotic assisted right upper lobectomy, mediastinal LN dissection on 4/21/21. Pt states I need lung sx. Pt is a poor historian. States " I am having a lung sx, I have a 1.7 lesion that will be removed so cancer don't spread". Pt had a CT scan as per PCP due to smoking hx. Pt states + cough for few years.  (07 Apr 2021 12:13)      Procedure: Flex bronch, robot RVATS, pulmonary adhesions and pneumolysis, right upper lobectomy with mediastinal lymph node dissection  4/21/2021                      Issues:              Lung nodule              Postop pain  Chest tube in place  HTN  Hypothyroidism                 Home Medications:  Claritin 10 mg oral tablet: 1 tab(s) orally once a day (21 Apr 2021 08:57)  Garlic oral capsule: 3000 milligram(s) orally once a day (21 Apr 2021 08:57)  hydroCHLOROthiazide 25 mg oral tablet: 1 tab(s) orally once a day (21 Apr 2021 08:57)  levothyroxine 100 mcg (0.1 mg) oral tablet: 1 tab(s) orally once a day (21 Apr 2021 08:57)  Probiotic Formula oral capsule: 1 cap(s) orally once a day (21 Apr 2021 08:57)  Vitamin D3 1000 intl units (25 mcg) oral tablet: orally once a day (21 Apr 2021 08:57)      PAST MEDICAL & SURGICAL HISTORY:  HTN (hypertension)    Hypothyroid    HTN (hypertension)    H/O myomectomy      Vital Signs Last 24 Hrs  T(C): 36.6 (21 Apr 2021 08:46), Max: 36.6 (21 Apr 2021 08:46)  T(F): 97.9 (21 Apr 2021 08:46), Max: 97.9 (21 Apr 2021 08:46)  HR: 72 (21 Apr 2021 17:00) (71 - 94)  BP: 123/76 (21 Apr 2021 17:00) (123/76 - 132/74)  BP(mean): 88 (21 Apr 2021 17:00) (88 - 88)  RR: 12 (21 Apr 2021 17:00) (12 - 16)  SpO2: 100% (21 Apr 2021 17:00) (96% - 100%)  I&O's Detail    21 Apr 2021 07:01  -  21 Apr 2021 17:22  --------------------------------------------------------  IN:    Lactated Ringers: 60 mL  Total IN: 60 mL    OUT:  Total OUT: 0 mL    Total NET: 60 mL      CAPILLARY BLOOD GLUCOSE    Home Medications:  Claritin 10 mg oral tablet: 1 tab(s) orally once a day (21 Apr 2021 08:57)  Garlic oral capsule: 3000 milligram(s) orally once a day (21 Apr 2021 08:57)  hydroCHLOROthiazide 25 mg oral tablet: 1 tab(s) orally once a day (21 Apr 2021 08:57)  levothyroxine 100 mcg (0.1 mg) oral tablet: 1 tab(s) orally once a day (21 Apr 2021 08:57)  Probiotic Formula oral capsule: 1 cap(s) orally once a day (21 Apr 2021 08:57)  Vitamin D3 1000 intl units (25 mcg) oral tablet: orally once a day (21 Apr 2021 08:57)      MEDICATIONS  (STANDING):  heparin   Injectable 5000 Unit(s) SubCutaneous every 8 hours  HYDROmorphone PCA (1 mG/mL) 30 milliLiter(s) PCA Continuous PCA Continuous  lactated ringers. 1000 milliLiter(s) (30 mL/Hr) IV Continuous <Continuous>    MEDICATIONS  (PRN):  HYDROmorphone PCA (1 mG/mL) Rescue Clinician Bolus 0.5 milliGRAM(s) IV Push every 15 minutes PRN for Pain Scale GREATER THAN 6  nalbuphine Injectable 2.5 milliGRAM(s) IV Push every 6 hours PRN Pruritus  naloxone Injectable 0.1 milliGRAM(s) IV Push every 3 minutes PRN For ANY of the following changes in patient status:  A. RR LESS THAN 10 breaths per minute, B. Oxygen saturation LESS THAN 90%, C. Sedation score of 6  ondansetron Injectable 4 milliGRAM(s) IV Push every 6 hours PRN Nausea      Physical exam:                             General:               Pt is awake, alert,  appears to be in pain but not in  distress                                                  Neuro:                 Nonfocal                             Cardiovascular:    S1 & S2, regular                           Respiratory:         Air entry is fair and equal on both sides, has bilateral conducted sounds                           GI:                       Soft, nondistended and nontender, Bowel sounds active                            Ext:                      No cyanosis or edema               Plan:    General: 66yFemale s/p Flex bronch, robot RVATS, pulmonary adhesions and pneumolysis, right upper lobectomy with mediastinal lymph node dissection  4/21/2021, experiencing  pain with deep breathing.                             Neuro:                                         Pain control with PCA /  Tylenol PRN                            Cardiovascular:                                          HTN: Continue hemodynamic monitoring and restart HCTZ in AM                            Respiratory:                                         Pt is on 2L  nasal canula, wean off as tolerated                                          Comfortable, not in any distress.                                         Encourage incentive spirometry                                          Monitor chest tube output                                         Chest tube to suction, has small air leak                                                                Continue bronchodilators, pulmonary toilet                            GI                                         On clear liquids, advance to  regular diet as tolerated                                         Continue Zofran / Reglan for nausea - PRN	                                                                 Renal:                                         Continue LR  30cc/hr                                         Monitor I/Os and electrolytes                                                                                        Hem/ Onc:                                                                                  Monitor chest tube output &  signs of bleeding.                                          Follow CBC in AM                           Infectious disease:                                            Monitor for fever / leukocytosis.                                          All surgical incision / chest tube  sites look clean                            Endocrine      Hypothyroidism: On Synthroid                                          Continue Accu-Checks with coverage    Pt is on SQ Heparin and Venodyne boots for DVT prophylaxis.     Pertinent clinical, laboratory, radiographic, hemodynamic, echocardiographic, respiratory data, microbiologic data and chart were reviewed and analyzed frequently throughout the course of the day and night  Patient seen, examined and plan discussed with CT Surgeon Dr. Adam / CTICU team during rounds.    Status discussed with patient /  updated plan of care          Luis Weber MD

## 2021-04-22 LAB
ANION GAP SERPL CALC-SCNC: 11 MMOL/L — SIGNIFICANT CHANGE UP (ref 7–14)
ANION GAP SERPL CALC-SCNC: 9 MMOL/L — SIGNIFICANT CHANGE UP (ref 7–14)
BLOOD GAS ARTERIAL - LYTES,HGB,ICA,LACT RESULT: SIGNIFICANT CHANGE UP
BUN SERPL-MCNC: 13 MG/DL — SIGNIFICANT CHANGE UP (ref 7–23)
BUN SERPL-MCNC: 14 MG/DL — SIGNIFICANT CHANGE UP (ref 7–23)
CALCIUM SERPL-MCNC: 9.2 MG/DL — SIGNIFICANT CHANGE UP (ref 8.4–10.5)
CALCIUM SERPL-MCNC: 9.3 MG/DL — SIGNIFICANT CHANGE UP (ref 8.4–10.5)
CHLORIDE SERPL-SCNC: 100 MMOL/L — SIGNIFICANT CHANGE UP (ref 98–107)
CHLORIDE SERPL-SCNC: 100 MMOL/L — SIGNIFICANT CHANGE UP (ref 98–107)
CO2 SERPL-SCNC: 23 MMOL/L — SIGNIFICANT CHANGE UP (ref 22–31)
CO2 SERPL-SCNC: 25 MMOL/L — SIGNIFICANT CHANGE UP (ref 22–31)
COVID-19 SPIKE DOMAIN AB INTERP: NEGATIVE — SIGNIFICANT CHANGE UP
COVID-19 SPIKE DOMAIN ANTIBODY RESULT: 0.4 U/ML — SIGNIFICANT CHANGE UP
CREAT SERPL-MCNC: 0.58 MG/DL — SIGNIFICANT CHANGE UP (ref 0.5–1.3)
CREAT SERPL-MCNC: 0.67 MG/DL — SIGNIFICANT CHANGE UP (ref 0.5–1.3)
GLUCOSE SERPL-MCNC: 165 MG/DL — HIGH (ref 70–99)
GLUCOSE SERPL-MCNC: 280 MG/DL — HIGH (ref 70–99)
HCT VFR BLD CALC: 39 % — SIGNIFICANT CHANGE UP (ref 34.5–45)
HGB BLD-MCNC: 12.9 G/DL — SIGNIFICANT CHANGE UP (ref 11.5–15.5)
MAGNESIUM SERPL-MCNC: 1.8 MG/DL — SIGNIFICANT CHANGE UP (ref 1.6–2.6)
MAGNESIUM SERPL-MCNC: 2.4 MG/DL — SIGNIFICANT CHANGE UP (ref 1.6–2.6)
MCHC RBC-ENTMCNC: 30.4 PG — SIGNIFICANT CHANGE UP (ref 27–34)
MCHC RBC-ENTMCNC: 33.1 GM/DL — SIGNIFICANT CHANGE UP (ref 32–36)
MCV RBC AUTO: 91.8 FL — SIGNIFICANT CHANGE UP (ref 80–100)
NRBC # BLD: 0 /100 WBCS — SIGNIFICANT CHANGE UP
NRBC # FLD: 0 K/UL — SIGNIFICANT CHANGE UP
PHOSPHATE SERPL-MCNC: 2.6 MG/DL — SIGNIFICANT CHANGE UP (ref 2.5–4.5)
PHOSPHATE SERPL-MCNC: 3.2 MG/DL — SIGNIFICANT CHANGE UP (ref 2.5–4.5)
PLATELET # BLD AUTO: 287 K/UL — SIGNIFICANT CHANGE UP (ref 150–400)
POTASSIUM SERPL-MCNC: 4.1 MMOL/L — SIGNIFICANT CHANGE UP (ref 3.5–5.3)
POTASSIUM SERPL-MCNC: 5.8 MMOL/L — HIGH (ref 3.5–5.3)
POTASSIUM SERPL-MCNC: SIGNIFICANT CHANGE UP MMOL/L (ref 3.5–5.3)
POTASSIUM SERPL-SCNC: 4.1 MMOL/L — SIGNIFICANT CHANGE UP (ref 3.5–5.3)
POTASSIUM SERPL-SCNC: 5.8 MMOL/L — HIGH (ref 3.5–5.3)
POTASSIUM SERPL-SCNC: SIGNIFICANT CHANGE UP MMOL/L (ref 3.5–5.3)
RBC # BLD: 4.25 M/UL — SIGNIFICANT CHANGE UP (ref 3.8–5.2)
RBC # FLD: 12.8 % — SIGNIFICANT CHANGE UP (ref 10.3–14.5)
SARS-COV-2 IGG+IGM SERPL QL IA: 0.4 U/ML — SIGNIFICANT CHANGE UP
SARS-COV-2 IGG+IGM SERPL QL IA: NEGATIVE — SIGNIFICANT CHANGE UP
SODIUM SERPL-SCNC: 134 MMOL/L — LOW (ref 135–145)
SODIUM SERPL-SCNC: 134 MMOL/L — LOW (ref 135–145)
WBC # BLD: 13.27 K/UL — HIGH (ref 3.8–10.5)
WBC # FLD AUTO: 13.27 K/UL — HIGH (ref 3.8–10.5)

## 2021-04-22 PROCEDURE — 99233 SBSQ HOSP IP/OBS HIGH 50: CPT

## 2021-04-22 PROCEDURE — 71045 X-RAY EXAM CHEST 1 VIEW: CPT | Mod: 26

## 2021-04-22 RX ORDER — NICOTINE POLACRILEX 2 MG
1 GUM BUCCAL DAILY
Refills: 0 | Status: DISCONTINUED | OUTPATIENT
Start: 2021-04-22 | End: 2021-04-27

## 2021-04-22 RX ORDER — ALBUMIN HUMAN 25 %
250 VIAL (ML) INTRAVENOUS ONCE
Refills: 0 | Status: COMPLETED | OUTPATIENT
Start: 2021-04-22 | End: 2021-04-22

## 2021-04-22 RX ORDER — MAGNESIUM SULFATE 500 MG/ML
2 VIAL (ML) INJECTION ONCE
Refills: 0 | Status: COMPLETED | OUTPATIENT
Start: 2021-04-22 | End: 2021-04-22

## 2021-04-22 RX ADMIN — Medication 100 MICROGRAM(S): at 05:22

## 2021-04-22 RX ADMIN — HEPARIN SODIUM 5000 UNIT(S): 5000 INJECTION INTRAVENOUS; SUBCUTANEOUS at 05:22

## 2021-04-22 RX ADMIN — HYDROMORPHONE HYDROCHLORIDE 30 MILLILITER(S): 2 INJECTION INTRAMUSCULAR; INTRAVENOUS; SUBCUTANEOUS at 07:03

## 2021-04-22 RX ADMIN — HYDROMORPHONE HYDROCHLORIDE 30 MILLILITER(S): 2 INJECTION INTRAMUSCULAR; INTRAVENOUS; SUBCUTANEOUS at 19:07

## 2021-04-22 RX ADMIN — Medication 1 PATCH: at 16:33

## 2021-04-22 RX ADMIN — HEPARIN SODIUM 5000 UNIT(S): 5000 INJECTION INTRAVENOUS; SUBCUTANEOUS at 21:28

## 2021-04-22 RX ADMIN — Medication 50 GRAM(S): at 06:21

## 2021-04-22 RX ADMIN — Medication 1 PATCH: at 11:32

## 2021-04-22 RX ADMIN — HEPARIN SODIUM 5000 UNIT(S): 5000 INJECTION INTRAVENOUS; SUBCUTANEOUS at 15:20

## 2021-04-22 RX ADMIN — Medication 125 MILLILITER(S): at 11:13

## 2021-04-22 NOTE — PROGRESS NOTE ADULT - SUBJECTIVE AND OBJECTIVE BOX
PROCEDURE: robot RVATS, pulmonary adhesions and pneumolysis, right upper lobectomy with mediastinal lymph node dissection  21-Apr-2021       ISSUES:   Lung nodule  Post op pain  Chest tube in place  COPD  Smoker  HTN  Hypothyroidism    INTERVAL EVENTS:   Chest tube with airleak.      HISTORY:   Patient reports moderate pain at chest wall incision sites which is worse with coughing and deep breathing without associated fever or dyspnea. Pain is improved with use of pain meds.     PHYSICAL EXAM:   Gen: Comfortable, No acute distress  Eyes: Sclera white, Conjunctiva normal, Eyelids normal, Pupils symmetrical   ENT: Mucous membranes moist,  ,  ,    Neck: Trachea midline,  ,  ,  ,  ,    CV: Rate regular, Rhythm regular,  ,  ,    Resp: Breath sounds clear, No accessory muscles use, R chest tube in place,    Abd: Soft, Non-distended, Non-tender, Bowel sounds normal,  ,  ,    Skin: Warm, No peripheral edema of lower extremities,  ,    : No gil  Neuro: Moving all 4 extremities,    Psych: A&Ox3      ASSESSMENT AND PLAN:     NEURO:  Post-operative Pain - Pain control with PCA and Tylenol IV PRN.        RESPIRATORY:  Hypoxia - Wean nasal cannula for goal O2sat above 92. Obtain CXR. Incentive spirometry. Chest PT and frequent suctioning. Continue bronchodilators. OOB to chair & ambulate w/ assistance. Continuous pulse oximetry for support & to prevent decompensation.       Chest tube – Pleurevac regulated suctioning. Monitor chest tube output.    COPD - stable. Continue home inhalers.       CARDIOVASCULAR:  Hemodynamically stable - Not on pressors. Continue hemodynamic monitoring.  HTN - stable. Hold home antihypertensives for now.       RENAL:  Stable - Monitor IOs and electrolytes. Keep K above 4.0 and Mg above 2.0.         GASTROINTESTINAL:  GI prophylaxis not indicated  Zofran and Reglan IV PRN for nausea  Regular consistency diet         HEMATOLOGIC:  No signs of active bleeding. Monitor Hgb in CBC in AM  DVT prophylaxis with heparin subQ and SCDs.         INFECTIOUS DISEASE:  All surgical sites appear clean. No signs of active infection. Will monitor for fever and leukocytosis.          ENDOCRINE:  Stable – Monitor glucose fingersticks for goal 120-180.  Hypothyroidism - stable. Continue Synthroid.         Pertinent clinical, laboratory, radiographic, hemodynamic, echocardiographic, respiratory data, microbiologic data and chart were reviewed by myself and analyzed frequently throughout the course of the day and night by myself.    Plan discussed at length with the CTICU staff and Attending CT Surgeon.     Patient's status was discussed with patient at bedside.           ________________________________________________      _________________________  VITAL SIGNS:  Vital Signs Last 24 Hrs  T(C): 36.8 (22 Apr 2021 12:10), Max: 36.8 (22 Apr 2021 00:00)  T(F): 98.3 (22 Apr 2021 12:10), Max: 98.3 (22 Apr 2021 12:10)  HR: 76 (22 Apr 2021 12:10) (65 - 77)  BP: 134/94 (22 Apr 2021 12:10) (119/73 - 166/91)  BP(mean): 89 (22 Apr 2021 11:00) (82 - 111)  RR: 20 (22 Apr 2021 12:10) (12 - 23)  SpO2: 98% (22 Apr 2021 12:10) (94% - 100%)  I/Os:   I&O's Detail    21 Apr 2021 07:01  -  22 Apr 2021 07:00  --------------------------------------------------------  IN:    IV PiggyBack: 50 mL    Lactated Ringers: 420 mL  Total IN: 470 mL    OUT:    Chest Tube (mL): 155 mL    Voided (mL): 535 mL  Total OUT: 690 mL    Total NET: -220 mL      22 Apr 2021 07:01  -  22 Apr 2021 13:35  --------------------------------------------------------  IN:    Lactated Ringers: 120 mL  Total IN: 120 mL    OUT:    Voided (mL): 350 mL  Total OUT: 350 mL    Total NET: -230 mL              MEDICATIONS:  MEDICATIONS  (STANDING):  heparin   Injectable 5000 Unit(s) SubCutaneous every 8 hours  HYDROmorphone PCA (1 mG/mL) 30 milliLiter(s) PCA Continuous PCA Continuous  lactated ringers. 1000 milliLiter(s) (30 mL/Hr) IV Continuous <Continuous>  levothyroxine 100 MICROGram(s) Oral daily  nicotine - 21 mG/24Hr(s) Patch 1 patch Transdermal daily    MEDICATIONS  (PRN):  acetaminophen  IVPB .. 1000 milliGRAM(s) IV Intermittent once PRN Temp greater or equal to 38C (100.4F), Mild Pain (1 - 3)  acetaminophen  IVPB .. 1000 milliGRAM(s) IV Intermittent once PRN Mild Pain (1 - 3)  HYDROmorphone PCA (1 mG/mL) Rescue Clinician Bolus 0.5 milliGRAM(s) IV Push every 15 minutes PRN for Pain Scale GREATER THAN 6  nalbuphine Injectable 2.5 milliGRAM(s) IV Push every 6 hours PRN Pruritus  naloxone Injectable 0.1 milliGRAM(s) IV Push every 3 minutes PRN For ANY of the following changes in patient status:  A. RR LESS THAN 10 breaths per minute, B. Oxygen saturation LESS THAN 90%, C. Sedation score of 6  ondansetron Injectable 4 milliGRAM(s) IV Push every 6 hours PRN Nausea      LABS:                        12.9   13.27 )-----------( 287      ( 22 Apr 2021 05:31 )             39.0     04-22    134<L>  |  100  |  13  ----------------------------<  280<H>  4.1   |  23  |  0.58    Ca    9.3      22 Apr 2021 09:38  Phos  2.6     04-22  Mg     2.4     04-22          ABG - ( 22 Apr 2021 09:38 )  pH, Arterial: 7.42  pH, Blood: x     /  pCO2: 38    /  pO2: 80    / HCO3: 25    / Base Excess: 0.4   /  SaO2: 95.8                _________________________

## 2021-04-22 NOTE — DIETITIAN INITIAL EVALUATION ADULT. - ORAL INTAKE PTA/DIET HISTORY
met w/pt who provided nutrition hx.  Pt denies food allergies, difficulty chewing/swallowing or modified diet PTA.  She avoids pork.  She takes supplements of fish oil, garlic and probiotics. Pt endorses wt loss of ~13lbs despite good food intake x 1 year.  She states her appetite is good at this time and is happy that diet has been advanced to regular.

## 2021-04-22 NOTE — DIETITIAN INITIAL EVALUATION ADULT. - OTHER INFO
65 y/o F presents to PST w/ a preop dx of malignant neoplasm of unspecified part of unspecified bronchus or lung and to be evaluated for a scheduled right VATS, robotic assisted right upper lobectomy, mediastinal LN dissection on 4/21/21. Pt states I need lung sx. Pt is a poor historian. States " I am having a lung sx, I have a 1.7 lesion that will be removed so cancer don't spread". Pt had a CT scan as per PCP due to smoking hx. Pt states + cough for few years.  (07 Apr 2021 12:13)  Procedure: Flex bronch, robot RVATS, pulmonary adhesions and pneumolysis, right upper lobectomy with mediastinal lymph node dissection  4/21/2021.

## 2021-04-22 NOTE — DIETITIAN INITIAL EVALUATION ADULT. - ADD RECOMMEND
1) Monitor weights, labs, BM's, skin integrity, p.o. intake; 2) Encourage and assist pt w/meals; 3) Add high kcal/protein supplement between meals to enhance kcal intake

## 2021-04-22 NOTE — PROGRESS NOTE ADULT - SUBJECTIVE AND OBJECTIVE BOX
POST ANESTHESIA EVALUATION    66y Female POSTOP DAY 1 S/P     MENTAL STATUS: Patient participation [ X ] Awake     [  ] Arousable     [  ] Sedated    AIRWAY PATENCY: [  X] Satisfactory  [  ] Other:     Vital Signs Last 24 Hrs  T(C): 36.5 (22 Apr 2021 04:00), Max: 36.8 (22 Apr 2021 00:00)  T(F): 97.7 (22 Apr 2021 04:00), Max: 98.2 (22 Apr 2021 00:00)  HR: 65 (22 Apr 2021 07:00) (65 - 94)  BP: 128/77 (22 Apr 2021 07:00) (119/73 - 166/91)  BP(mean): 90 (22 Apr 2021 07:00) (83 - 111)  RR: 18 (22 Apr 2021 07:00) (12 - 20)  SpO2: 99% (22 Apr 2021 07:00) (95% - 100%)  I&O's Summary    21 Apr 2021 07:01  -  22 Apr 2021 07:00  --------------------------------------------------------  IN: 470 mL / OUT: 690 mL / NET: -220 mL          NAUSEA/ VOMITTING:  [ X ] NONE  [  ] CONTROLLED [  ] OTHER     PAIN: [ X ] CONTROLLED WITH CURRENT REGIMEN  [  ] OTHER    [ X ] NO APPARENT ANESTHESIA COMPLICATIONS      Comments:

## 2021-04-22 NOTE — PROGRESS NOTE ADULT - SUBJECTIVE AND OBJECTIVE BOX
Anesthesia Pain Management Service    SUBJECTIVE: Patient is doing well with IV PCA and no significant problems reported.    Pain Scale Score	At rest: _2__ 	With Activity: ___ 	[X ] Refer to charted pain scores    THERAPY:    [ ] IV PCA Morphine		[ ] 5 mg/mL	[ ] 1 mg/mL  [X ] IV PCA Hydromorphone	[ ] 5 mg/mL	[X ] 1 mg/mL  [ ] IV PCA Fentanyl		[ ] 50 micrograms/mL    Demand dose __0.2_ lockout __6_ (minutes) Continuous Rate _0__ Total: _0.7__  mg used (in past 24 hours)      MEDICATIONS  (STANDING):  heparin   Injectable 5000 Unit(s) SubCutaneous every 8 hours  HYDROmorphone PCA (1 mG/mL) 30 milliLiter(s) PCA Continuous PCA Continuous  lactated ringers. 1000 milliLiter(s) (30 mL/Hr) IV Continuous <Continuous>  levothyroxine 100 MICROGram(s) Oral daily  nicotine - 21 mG/24Hr(s) Patch 1 patch Transdermal daily    MEDICATIONS  (PRN):  acetaminophen  IVPB .. 1000 milliGRAM(s) IV Intermittent once PRN Temp greater or equal to 38C (100.4F), Mild Pain (1 - 3)  acetaminophen  IVPB .. 1000 milliGRAM(s) IV Intermittent once PRN Mild Pain (1 - 3)  HYDROmorphone PCA (1 mG/mL) Rescue Clinician Bolus 0.5 milliGRAM(s) IV Push every 15 minutes PRN for Pain Scale GREATER THAN 6  nalbuphine Injectable 2.5 milliGRAM(s) IV Push every 6 hours PRN Pruritus  naloxone Injectable 0.1 milliGRAM(s) IV Push every 3 minutes PRN For ANY of the following changes in patient status:  A. RR LESS THAN 10 breaths per minute, B. Oxygen saturation LESS THAN 90%, C. Sedation score of 6  ondansetron Injectable 4 milliGRAM(s) IV Push every 6 hours PRN Nausea      OBJECTIVE: sitting in chair     Sedation Score:	[ X] Alert	[ ] Drowsy 	[ ] Arousable	[ ] Asleep	[ ] Unresponsive    Side Effects:	[X ] None	[ ] Nausea	[ ] Vomiting	[ ] Pruritus  		[ ] Other:    Vital Signs Last 24 Hrs  T(C): 36.5 (22 Apr 2021 04:00), Max: 36.8 (22 Apr 2021 00:00)  T(F): 97.7 (22 Apr 2021 04:00), Max: 98.2 (22 Apr 2021 00:00)  HR: 69 (22 Apr 2021 08:00) (65 - 77)  BP: 149/81 (22 Apr 2021 08:00) (119/73 - 166/91)  BP(mean): 99 (22 Apr 2021 08:00) (83 - 111)  RR: 21 (22 Apr 2021 08:00) (12 - 21)  SpO2: 98% (22 Apr 2021 08:00) (95% - 100%)    ASSESSMENT/ PLAN    Therapy to  be:	[ X] Continue   [ ] Discontinued   [ ] Change to prn Analgesics    Documentation and Verification of current medications:   [X] Done	[ ] Not done, not elligible    Comments: Recommend non-opioid adjuvant analgesics to be used when possible and when allowed by primary surgical team.    Progress Note written now but Patient was seen earlier.

## 2021-04-23 LAB — SURGICAL PATHOLOGY STUDY: SIGNIFICANT CHANGE UP

## 2021-04-23 PROCEDURE — 71045 X-RAY EXAM CHEST 1 VIEW: CPT | Mod: 26

## 2021-04-23 RX ORDER — HYDROMORPHONE HYDROCHLORIDE 2 MG/ML
0.3 INJECTION INTRAMUSCULAR; INTRAVENOUS; SUBCUTANEOUS EVERY 4 HOURS
Refills: 0 | Status: DISCONTINUED | OUTPATIENT
Start: 2021-04-23 | End: 2021-04-23

## 2021-04-23 RX ORDER — TRAMADOL HYDROCHLORIDE 50 MG/1
25 TABLET ORAL EVERY 6 HOURS
Refills: 0 | Status: DISCONTINUED | OUTPATIENT
Start: 2021-04-23 | End: 2021-04-27

## 2021-04-23 RX ORDER — LIDOCAINE 4 G/100G
1 CREAM TOPICAL EVERY 24 HOURS
Refills: 0 | Status: DISCONTINUED | OUTPATIENT
Start: 2021-04-23 | End: 2021-04-27

## 2021-04-23 RX ADMIN — Medication 1 PATCH: at 19:15

## 2021-04-23 RX ADMIN — Medication 1000 MILLIGRAM(S): at 15:48

## 2021-04-23 RX ADMIN — Medication 1 PATCH: at 08:28

## 2021-04-23 RX ADMIN — LIDOCAINE 1 PATCH: 4 CREAM TOPICAL at 21:14

## 2021-04-23 RX ADMIN — Medication 400 MILLIGRAM(S): at 15:18

## 2021-04-23 RX ADMIN — Medication 1 PATCH: at 11:00

## 2021-04-23 RX ADMIN — HYDROMORPHONE HYDROCHLORIDE 30 MILLILITER(S): 2 INJECTION INTRAMUSCULAR; INTRAVENOUS; SUBCUTANEOUS at 07:48

## 2021-04-23 RX ADMIN — LIDOCAINE 1 PATCH: 4 CREAM TOPICAL at 15:18

## 2021-04-23 RX ADMIN — Medication 1 PATCH: at 15:17

## 2021-04-23 NOTE — PROGRESS NOTE ADULT - SUBJECTIVE AND OBJECTIVE BOX
Subjective: R ear pain "for a while now" - no obvious erythema, drainage or deformity  discomfort at chest tube / surgical site, using IVPCA      Vital Signs:  Vital Signs Last 24 Hrs  T(C): 37.3 (04-23-21 @ 09:00), Max: 37.4 (04-22-21 @ 19:34)  T(F): 99.1 (04-23-21 @ 09:00), Max: 99.3 (04-22-21 @ 19:34)  HR: 65 (04-23-21 @ 09:00) (65 - 77)  BP: 122/66 (04-23-21 @ 09:00) (117/72 - 160/84)  RR: 18 (04-23-21 @ 09:00) (18 - 23)  SpO2: 99% (04-23-21 @ 09:00) (94% - 100%) on (O2)    Telemetry/Alarms: sr  General: WN/WD NAD  Neurology: Awake, nonfocal, TANNER x 4  Eyes: Scleras clear, PERRLA/ EOMI, Gross vision intact  ENT:Gross hearing intact, grossly patent pharynx, no stridor  Neck: Neck supple, trachea midline, No JVD,   Respiratory: CTA B/L, No wheezing, rales, rhonchi  CV: RRR, S1S2, no murmurs, rubs or gallops  Abdominal: Soft, NT, ND +BS,   Extremities: No edema, + peripheral pulses  Skin: No Rashes, Hematoma, Ecchymosis  Lymphatic: No Neck, axilla, groin LAD  Psych: Oriented x 3, normal affect  Incisions: c,d,i  Tubes: chest tube on suction with passive expiratory air leak    Relevant labs, radiology and Medications reviewed                        12.9 13.27 )-----------( 287      ( 22 Apr 2021 05:31 )             39.0     04-22    134<L>  |  100  |  13  ----------------------------<  280<H>  4.1   |  23  |  0.58    Ca    9.3      22 Apr 2021 09:38  Phos  2.6     04-22  Mg     2.4     04-22        MEDICATIONS  (STANDING):  heparin   Injectable 5000 Unit(s) SubCutaneous every 8 hours  HYDROmorphone PCA (1 mG/mL) 30 milliLiter(s) PCA Continuous PCA Continuous  lactated ringers. 1000 milliLiter(s) (30 mL/Hr) IV Continuous <Continuous>  levothyroxine 100 MICROGram(s) Oral daily  nicotine - 21 mG/24Hr(s) Patch 1 patch Transdermal daily    MEDICATIONS  (PRN):  acetaminophen  IVPB .. 1000 milliGRAM(s) IV Intermittent once PRN Temp greater or equal to 38C (100.4F), Mild Pain (1 - 3)  acetaminophen  IVPB .. 1000 milliGRAM(s) IV Intermittent once PRN Mild Pain (1 - 3)  HYDROmorphone PCA (1 mG/mL) Rescue Clinician Bolus 0.5 milliGRAM(s) IV Push every 15 minutes PRN for Pain Scale GREATER THAN 6  nalbuphine Injectable 2.5 milliGRAM(s) IV Push every 6 hours PRN Pruritus  naloxone Injectable 0.1 milliGRAM(s) IV Push every 3 minutes PRN For ANY of the following changes in patient status:  A. RR LESS THAN 10 breaths per minute, B. Oxygen saturation LESS THAN 90%, C. Sedation score of 6  ondansetron Injectable 4 milliGRAM(s) IV Push every 6 hours PRN Nausea    Pertinent Physical Exam  I&O's Summary    22 Apr 2021 07:01  -  23 Apr 2021 07:00  --------------------------------------------------------  IN: 600 mL / OUT: 868 mL / NET: -268 mL        Assessment  66y Female  w/ PAST MEDICAL & SURGICAL HISTORY:  HTN (hypertension)  Hypothyroid  HTN (hypertension)  H/O myomectomy    Patient is a 66y old  Female s/p Robot-assisted lobectomy of right lung with video-assisted thoracoscopic surgery (VATS)  Postoperative course/issues: postop chest tube remains on suction with a passive expiratory air leak.     PLAN  Neuro: Pain management  Pulm: Encourage coughing, deep breathing and use of incentive spirometry. Wean off supplemental oxygen as able. Daily CXR.   Cardio: Monitor telemetry/alarms  GI: Tolerating diet. Continue stool softeners.  Renal: monitor urine output, supplement electrolytes as needed  Vasc: Heparin SC/SCDs for DVT prophylaxis  Heme: Stable H/H. .   ID: Off antibiotics. Stable.  Therapy: OOB/ambulate  Tubes: Monitor Chest tube output and air leak status  Disposition: Aim to D/C to home once chest tube removed or can be placed to a mini atrium  Discussed with Cardiothoracic Team at AM rounds.

## 2021-04-23 NOTE — PROGRESS NOTE ADULT - SUBJECTIVE AND OBJECTIVE BOX
Anesthesia Pain Management Service    SUBJECTIVE:    Therapy:	  [x ] IV PCA	   [ ] Epidural           [ ] s/p Spinal Opoid              [ ] Postpartum infusion	  [ ] Patient controlled regional anesthesia (PCRA)    [ ] prn Analgesics    OBJECTIVE:   [ x] No new signs     [ ] Other:    Side Effects:  [ x] None			[ ] Other:    Assessment of Catheter Site:		[ ] Intact		[ ] Other:    ASSESSMENT/PLAN  [ ] Continue current therapy    [x ] Therapy changed to:    [ ] IV PCA       [ ] Epidural     [ x] prn Analgesics     Comments:

## 2021-04-23 NOTE — PROGRESS NOTE ADULT - SUBJECTIVE AND OBJECTIVE BOX
Anesthesia Pain Management Service    SUBJECTIVE: Patient does not like the IV Dilaudid PCA and calls it "dope."  Patient does not like Oxycodone either, she just wants IV Tylenol and a lidocaine patch.     Pain Scale Score	At rest: _5/10__ 	With Activity: ___ 	[X ] Refer to charted pain scores    THERAPY:    [ ] IV PCA Morphine		[ ] 5 mg/mL	[ ] 1 mg/mL  [X ] IV PCA Hydromorphone	[ ] 5 mg/mL	[X ] 1 mg/mL  [ ] IV PCA Fentanyl		[ ] 50 micrograms/mL    Demand dose __0.2_ lockout __6_ (minutes) Continuous Rate _0__ Total: _1.6__   mg used (in past 24 hrs)      MEDICATIONS  (STANDING):  heparin   Injectable 5000 Unit(s) SubCutaneous every 8 hours  lactated ringers. 1000 milliLiter(s) (30 mL/Hr) IV Continuous <Continuous>  levothyroxine 100 MICROGram(s) Oral daily  lidocaine   Patch 1 Patch Transdermal every 24 hours  nicotine - 21 mG/24Hr(s) Patch 1 patch Transdermal daily    MEDICATIONS  (PRN):  acetaminophen  IVPB .. 1000 milliGRAM(s) IV Intermittent once PRN Temp greater or equal to 38C (100.4F), Mild Pain (1 - 3)  acetaminophen  IVPB .. 1000 milliGRAM(s) IV Intermittent once PRN Mild Pain (1 - 3)  HYDROmorphone  Injectable 0.3 milliGRAM(s) IV Push every 4 hours PRN Severe breakthrough Pain (7 - 10)  naloxone Injectable 0.1 milliGRAM(s) IV Push every 3 minutes PRN For ANY of the following changes in patient status:  A. RR LESS THAN 10 breaths per minute, B. Oxygen saturation LESS THAN 90%, C. Sedation score of 6  traMADol 25 milliGRAM(s) Oral every 6 hours PRN Moderate to Severe Pain (4 - 10)      OBJECTIVE:  Patient is sitting up in bed.     Sedation Score:	[ X] Alert	[ ] Drowsy 	[ ] Arousable	[ ] Asleep	[ ] Unresponsive    Side Effects:	[X ] None	[ ] Nausea	[ ] Vomiting	[ ] Pruritus  		[ ] Other:    Vital Signs Last 24 Hrs  T(C): 37.3 (23 Apr 2021 09:00), Max: 37.4 (22 Apr 2021 19:34)  T(F): 99.1 (23 Apr 2021 09:00), Max: 99.3 (22 Apr 2021 19:34)  HR: 65 (23 Apr 2021 09:00) (65 - 77)  BP: 122/66 (23 Apr 2021 09:00) (117/72 - 160/84)  BP(mean): --  RR: 18 (23 Apr 2021 09:00) (18 - 18)  SpO2: 99% (23 Apr 2021 09:00) (96% - 100%)    ASSESSMENT/ PLAN    Therapy to  be:	[ ] Continue   [ X] Discontinued   [X ] Change to prn Analgesics    Documentation and Verification of current medications:   [X] Done	[ ] Not done, not elligible    Comments: IV Dilaudid PCA discontinued. PRN Oral/IV opioids and/or Adjuvant non-opioid medication to be ordered at this point.

## 2021-04-24 PROCEDURE — 71045 X-RAY EXAM CHEST 1 VIEW: CPT | Mod: 26

## 2021-04-24 RX ORDER — HYDROCHLOROTHIAZIDE 25 MG
25 TABLET ORAL DAILY
Refills: 0 | Status: DISCONTINUED | OUTPATIENT
Start: 2021-04-24 | End: 2021-04-27

## 2021-04-24 RX ORDER — LORATADINE 10 MG/1
10 TABLET ORAL DAILY
Refills: 0 | Status: DISCONTINUED | OUTPATIENT
Start: 2021-04-24 | End: 2021-04-27

## 2021-04-24 RX ORDER — ACETAMINOPHEN 500 MG
1000 TABLET ORAL ONCE
Refills: 0 | Status: COMPLETED | OUTPATIENT
Start: 2021-04-24 | End: 2021-04-24

## 2021-04-24 RX ADMIN — Medication 1 PATCH: at 07:14

## 2021-04-24 RX ADMIN — LIDOCAINE 1 PATCH: 4 CREAM TOPICAL at 12:50

## 2021-04-24 RX ADMIN — Medication 1000 MILLIGRAM(S): at 05:44

## 2021-04-24 RX ADMIN — Medication 1 PATCH: at 12:48

## 2021-04-24 RX ADMIN — Medication 25 MILLIGRAM(S): at 05:45

## 2021-04-24 RX ADMIN — LIDOCAINE 1 PATCH: 4 CREAM TOPICAL at 03:25

## 2021-04-24 RX ADMIN — Medication 100 MICROGRAM(S): at 05:11

## 2021-04-24 RX ADMIN — Medication 400 MILLIGRAM(S): at 05:26

## 2021-04-24 RX ADMIN — LORATADINE 10 MILLIGRAM(S): 10 TABLET ORAL at 12:50

## 2021-04-24 RX ADMIN — Medication 1 PATCH: at 12:46

## 2021-04-24 RX ADMIN — Medication 1 PATCH: at 19:04

## 2021-04-24 RX ADMIN — LIDOCAINE 1 PATCH: 4 CREAM TOPICAL at 19:04

## 2021-04-24 NOTE — PROGRESS NOTE ADULT - SUBJECTIVE AND OBJECTIVE BOX
Subjective: "My pain and head is much better today. I like what I'm getting now" Pt pleased w change to Tramadol and lido patch. States sedation improved. Pain controlled. Pt found this am in bed with CT kinked under her body. CT repositioned and large evacuation of air seen in pleuravac. Clinically asymptomatic.     Vital Signs:  Vital Signs Last 24 Hrs  T(C): 36.9 (04-24-21 @ 08:39), Max: 37 (04-23-21 @ 16:49)  T(F): 98.5 (04-24-21 @ 08:39), Max: 98.6 (04-23-21 @ 16:49)  HR: 70 (04-24-21 @ 08:39) (64 - 84)  BP: 150/84 (04-24-21 @ 08:39) (109/62 - 171/82)  RR: 18 (04-24-21 @ 08:39) (16 - 18)  SpO2: 99% (04-24-21 @ 08:39) (95% - 99%) on (O2)    Telemetry/Alarms:  General: WN/WD NAD  Neurology: Awake, nonfocal, TANNER x 4  Eyes: Scleras clear, PERRLA/ EOMI, Gross vision intact  ENT:Gross hearing intact, grossly patent pharynx, no stridor  Neck: Neck supple, trachea midline, No JVD,   Respiratory: CTA B/L, No wheezing, rales, rhonchi  CV: RRR, S1S2, no murmurs, rubs or gallops  Abdominal: Soft, NT, ND +BS,   Extremities: No edema, + peripheral pulses  Skin: No Rashes, Hematoma, Ecchymosis  Lymphatic: No Neck, axilla, groin LAD  Psych: Oriented x 3, normal affect  Incisions:   Tubes:  Relevant labs, radiology and Medications reviewed            MEDICATIONS  (STANDING):  heparin   Injectable 5000 Unit(s) SubCutaneous every 8 hours  hydrochlorothiazide 25 milliGRAM(s) Oral daily  levothyroxine 100 MICROGram(s) Oral daily  lidocaine   Patch 1 Patch Transdermal every 24 hours  loratadine 10 milliGRAM(s) Oral daily  nicotine - 21 mG/24Hr(s) Patch 1 patch Transdermal daily    MEDICATIONS  (PRN):  acetaminophen  IVPB .. 1000 milliGRAM(s) IV Intermittent once PRN Mild Pain (1 - 3)  naloxone Injectable 0.1 milliGRAM(s) IV Push every 3 minutes PRN For ANY of the following changes in patient status:  A. RR LESS THAN 10 breaths per minute, B. Oxygen saturation LESS THAN 90%, C. Sedation score of 6  traMADol 25 milliGRAM(s) Oral every 6 hours PRN Moderate to Severe Pain (4 - 10)    Pertinent Physical Exam  I&O's Summary    23 Apr 2021 07:01  -  24 Apr 2021 07:00  --------------------------------------------------------  IN: 0 mL / OUT: 167 mL / NET: -167 mL      Marital Status:  (   )    (   ) Single    (   )    (  )   Lives with: (  ) alone  (  ) children   (  ) spouse   (  ) parents  (  ) other  Recent Travel: No recent travel  Occupation:    Substance Use (street drugs): ( x ) never used  (  ) other:  Tobacco Usage:  ( x  ) never smoked   (   ) former smoker   (   ) current smoker  (     ) pack year  Alcohol Usage: None     Assessment  66y Female  w/ PAST MEDICAL & SURGICAL HISTORY:  HTN (hypertension)    Hypothyroid    HTN (hypertension)    H/O myomectomy    admitted with complaints of Patient is a 66y old  Female who presents with a chief complaint of " I am having lung sx" (22 Apr 2021 10:29)  .  On (Date), patient underwent Robot-assisted lobectomy of right lung with video-assisted thoracoscopic surgery (VATS)    . Postoperative course/issues:    PLAN  Neuro: Pain management  Pulm: Encourage coughing, deep breathing and use of incentive spirometry. Wean off supplemental oxygen as able. Daily CXR.   Cardio: Monitor telemetry/alarms  GI: Tolerating diet. Continue stool softeners.  Renal: monitor urine output, supplement electrolytes as needed  Vasc: Heparin SC/SCDs for DVT prophylaxis  Heme: Stable H/H. .   ID: Off antibiotics. Stable.  Therapy: OOB/ambulate  Tubes: Monitor Chest tube output  Disposition: Aim to D/C to home on  Discussed with Cardiothoracic Team at AM rounds.      Subjective: "My pain and head is much better today. I like what I'm getting now" Pt pleased w change to Tramadol and lido patch. States sedation improved. Pain controlled. Pt found this am in bed with CT kinked under her body. CT repositioned and large evacuation of air seen in pleuravac. Clinically asymptomatic. Using IS to 1000    Vital Signs:  Vital Signs Last 24 Hrs  T(C): 36.9 (04-24-21 @ 08:39), Max: 37 (04-23-21 @ 16:49)  T(F): 98.5 (04-24-21 @ 08:39), Max: 98.6 (04-23-21 @ 16:49)  HR: 70 (04-24-21 @ 08:39) (64 - 84)  BP: 150/84 (04-24-21 @ 08:39) (109/62 - 171/82)  RR: 18 (04-24-21 @ 08:39) (16 - 18)  SpO2: 99% (04-24-21 @ 08:39) (95% - 99%) on (O2)    Telemetry/Alarms:  General: WN/WD NAD  Neurology: Awake, nonfocal, TANNER x 4  Eyes: Scleras clear, PERRLA/ EOMI, Gross vision intact  ENT:Gross hearing intact, grossly patent pharynx, no stridor  Neck: Neck supple, trachea midline, No JVD,   Respiratory: Dec BS at apex. CTA on left  +SQ air to Rt lateral CW  CV: RRR, S1S2, no murmurs, rubs or gallops  Abdominal: Soft, NT, ND +BS, +BM today  Extremities: No edema, + peripheral pulses bilat hand swelling, chronic per pt  Skin: No Rashes, Hematoma, Ecchymosis  Lymphatic: No Neck, axilla, groin LAD  Psych: Oriented x 3, normal affect  Incisions: Rt. VATS c.d.i  Tubes: Rt. CT-167cc/24hrs on suction, expir AL.   Relevant labs, radiology and Medications reviewed    CXR w stable apical ptx. Inc SQ air- likely from kinked tube      MEDICATIONS  (STANDING):  heparin   Injectable 5000 Unit(s) SubCutaneous every 8 hours  hydrochlorothiazide 25 milliGRAM(s) Oral daily  levothyroxine 100 MICROGram(s) Oral daily  lidocaine   Patch 1 Patch Transdermal every 24 hours  loratadine 10 milliGRAM(s) Oral daily  nicotine - 21 mG/24Hr(s) Patch 1 patch Transdermal daily    MEDICATIONS  (PRN):  acetaminophen  IVPB .. 1000 milliGRAM(s) IV Intermittent once PRN Mild Pain (1 - 3)  naloxone Injectable 0.1 milliGRAM(s) IV Push every 3 minutes PRN For ANY of the following changes in patient status:  A. RR LESS THAN 10 breaths per minute, B. Oxygen saturation LESS THAN 90%, C. Sedation score of 6  traMADol 25 milliGRAM(s) Oral every 6 hours PRN Moderate to Severe Pain (4 - 10)    Pertinent Physical Exam  I&O's Summary    23 Apr 2021 07:01  -  24 Apr 2021 07:00  --------------------------------------------------------  IN: 0 mL / OUT: 167 mL / NET: -167 mL        Assessment  66y Female  w/ PAST MEDICAL & SURGICAL HISTORY:  HTN (hypertension)    Hypothyroid    HTN (hypertension)    H/O myomectomy    admitted with complaints of Patient is a 66y old  Female who presents with a chief complaint of " I am having lung sx" (22 Apr 2021 10:29)  .Patient is a 66y old  Female s/p Robot-assisted lobectomy of right lung with video-assisted thoracoscopic surgery (VATS) RUL on 4/21/2021  Postoperative course/issues: postop chest tube remains on suction with a passive expiratory air leak.         PLAN  Neuro: Pain management. Cont current regiment  Pulm: Encourage coughing, deep breathing and use of incentive spirometry.. Daily CXR.   Cardio: Monitor telemetry/alarms  GI: Tolerating diet. Continue stool softeners.  Renal: monitor urine output, supplement electrolytes as needed  Vasc: Heparin SC/SCDs for DVT prophylaxis  Heme: Stable H/H. .   ID: Off antibiotics. Stable.  Therapy: OOB/ambulate  Tubes: Monitor Chest tube output and air leak. Will cont to suction today  Disposition: Aim to D/C to home once CT removed.   Discussed with Cardiothoracic Team at AM rounds.

## 2021-04-25 PROCEDURE — 71045 X-RAY EXAM CHEST 1 VIEW: CPT | Mod: 26

## 2021-04-25 PROCEDURE — 71045 X-RAY EXAM CHEST 1 VIEW: CPT | Mod: 26,77

## 2021-04-25 RX ADMIN — LIDOCAINE 1 PATCH: 4 CREAM TOPICAL at 00:00

## 2021-04-25 RX ADMIN — Medication 400 MILLIGRAM(S): at 22:07

## 2021-04-25 RX ADMIN — TRAMADOL HYDROCHLORIDE 25 MILLIGRAM(S): 50 TABLET ORAL at 19:00

## 2021-04-25 RX ADMIN — HEPARIN SODIUM 5000 UNIT(S): 5000 INJECTION INTRAVENOUS; SUBCUTANEOUS at 22:07

## 2021-04-25 RX ADMIN — TRAMADOL HYDROCHLORIDE 25 MILLIGRAM(S): 50 TABLET ORAL at 18:00

## 2021-04-25 RX ADMIN — Medication 100 MICROGRAM(S): at 05:27

## 2021-04-25 RX ADMIN — Medication 1 PATCH: at 20:33

## 2021-04-25 RX ADMIN — Medication 25 MILLIGRAM(S): at 05:27

## 2021-04-25 RX ADMIN — Medication 1 PATCH: at 14:00

## 2021-04-25 RX ADMIN — LIDOCAINE 1 PATCH: 4 CREAM TOPICAL at 14:01

## 2021-04-25 RX ADMIN — LIDOCAINE 1 PATCH: 4 CREAM TOPICAL at 19:31

## 2021-04-25 RX ADMIN — Medication 1 PATCH: at 13:59

## 2021-04-25 RX ADMIN — Medication 1000 MILLIGRAM(S): at 22:30

## 2021-04-25 NOTE — PROGRESS NOTE ADULT - SUBJECTIVE AND OBJECTIVE BOX
Subjective: 67 yo female seen at bedside. Pt sleeping but awakened. Pt seems down and is upset that she is still here as she thought she would have been going home already.  Otherwise, patient stable and in NAD.     Vital Signs:  Vital Signs Last 24 Hrs  T(C): 36.8 (04-25-21 @ 05:25), Max: 37.4 (04-24-21 @ 20:32)  T(F): 98.2 (04-25-21 @ 05:25), Max: 99.3 (04-24-21 @ 20:32)  HR: 64 (04-25-21 @ 05:25) (64 - 72)  BP: 142/68 (04-25-21 @ 05:25) (135/90 - 169/70)  RR: 17 (04-25-21 @ 05:25) (17 - 18)  SpO2: 96% (04-25-21 @ 05:25) (96% - 100%) on (O2)    Pertinent Physical Exam:  Telemetry/Alarms: NSR  General: WN/WD NAD  Respiratory: CTA B/L, No wheezing, rales, rhonchi  CV: RRR, S1S2, no murmurs, rubs or gallops  Abdominal: Soft, NT, ND +BS,   Extremities: No edema, + peripheral pulses  Skin: No Rashes, Hematoma, Ecchymosis  Lymphatic: No Neck, axilla, groin LAD  Psych: Oriented x 3, normal affect  Incisions: CDI  Tubes: Right CT in place    Chest Tube: Right Side/    Air Leak: Yes[x] / No[]    Drainage:     I&O's Summary    24 Apr 2021 07:01  -  25 Apr 2021 07:00  --------------------------------------------------------  IN: 0 mL / OUT: 240 mL / NET: -240 mL        Relevant labs, radiology and Medications reviewed            MEDICATIONS  (STANDING):  heparin   Injectable 5000 Unit(s) SubCutaneous every 8 hours  hydrochlorothiazide 25 milliGRAM(s) Oral daily  levothyroxine 100 MICROGram(s) Oral daily  lidocaine   Patch 1 Patch Transdermal every 24 hours  loratadine 10 milliGRAM(s) Oral daily  nicotine - 21 mG/24Hr(s) Patch 1 patch Transdermal daily    MEDICATIONS  (PRN):  acetaminophen  IVPB .. 1000 milliGRAM(s) IV Intermittent once PRN Mild Pain (1 - 3)  naloxone Injectable 0.1 milliGRAM(s) IV Push every 3 minutes PRN For ANY of the following changes in patient status:  A. RR LESS THAN 10 breaths per minute, B. Oxygen saturation LESS THAN 90%, C. Sedation score of 6  traMADol 25 milliGRAM(s) Oral every 6 hours PRN Moderate to Severe Pain (4 - 10)      Assessment  admitted with complaints of Patient is a 66y old  Female who presents with a chief complaint of " I am having lung sx" (22 Apr 2021 10:29)  .Patient is a 66y old  Female s/p Robot-assisted lobectomy of right lung with video-assisted thoracoscopic surgery (VATS) RUL on 4/21/2021  Postoperative course/issues: postop chest tube remains on suction with a passive expiratory air leak.     4/25 CT placed to sxn yesterday after increase subcutaneous air due to a kinked tube. Improved today. Persistent passive AL    PLAN  Neuro: Pain management with PO pain meds PRN  Pulm: Encourage coughing, deep breathing and use of incentive spirometry. Daily CXR.   Cardio: Monitor telemetry/alarms  GI: Tolerating diet. Continue stool softeners.  Renal: monitor urine output, supplement electrolytes as needed  Vasc: Heparin SC/SCDs for DVT prophylaxis  Heme: Stable H/H.   ID: Off antibiotics. Stable.  Therapy: OOB/ambulate  Tubes: Monitor Chest tube output and maintain to sxn. AL noted  Disposition: Aim to D/C to home once chest tube AL resolved and CT removed. maintain CT to sxn for now  Discussed with Cardiothoracic Team at AM rounds.

## 2021-04-26 PROCEDURE — 71045 X-RAY EXAM CHEST 1 VIEW: CPT | Mod: 26

## 2021-04-26 RX ORDER — ACETAMINOPHEN 500 MG
1000 TABLET ORAL ONCE
Refills: 0 | Status: COMPLETED | OUTPATIENT
Start: 2021-04-26 | End: 2021-04-26

## 2021-04-26 RX ADMIN — HEPARIN SODIUM 5000 UNIT(S): 5000 INJECTION INTRAVENOUS; SUBCUTANEOUS at 05:15

## 2021-04-26 RX ADMIN — Medication 400 MILLIGRAM(S): at 20:41

## 2021-04-26 RX ADMIN — Medication 25 MILLIGRAM(S): at 05:14

## 2021-04-26 RX ADMIN — LIDOCAINE 1 PATCH: 4 CREAM TOPICAL at 02:00

## 2021-04-26 RX ADMIN — Medication 1 PATCH: at 13:33

## 2021-04-26 RX ADMIN — HEPARIN SODIUM 5000 UNIT(S): 5000 INJECTION INTRAVENOUS; SUBCUTANEOUS at 20:40

## 2021-04-26 RX ADMIN — Medication 1 PATCH: at 20:40

## 2021-04-26 RX ADMIN — Medication 100 MICROGRAM(S): at 05:15

## 2021-04-26 RX ADMIN — Medication 1 PATCH: at 06:14

## 2021-04-26 RX ADMIN — Medication 400 MILLIGRAM(S): at 08:40

## 2021-04-26 RX ADMIN — Medication 1000 MILLIGRAM(S): at 09:00

## 2021-04-26 RX ADMIN — Medication 1 PATCH: at 13:43

## 2021-04-26 RX ADMIN — LIDOCAINE 1 PATCH: 4 CREAM TOPICAL at 20:40

## 2021-04-26 RX ADMIN — LIDOCAINE 1 PATCH: 4 CREAM TOPICAL at 14:52

## 2021-04-26 NOTE — PROGRESS NOTE ADULT - SUBJECTIVE AND OBJECTIVE BOX
Subjective: "I don't like those narcotics, I only want IV Tylenol. It hurts with this tube on the suction" Pt states inc pain with CT placed backt o suction yesterday.   Ambulating ad bashir in room. No CP or SOB.     Vital Signs:  Vital Signs Last 24 Hrs  T(C): 36.9 (04-26-21 @ 05:00), Max: 37.2 (04-25-21 @ 19:58)  T(F): 98.5 (04-26-21 @ 05:00), Max: 99 (04-25-21 @ 19:58)  HR: 58 (04-26-21 @ 05:00) (58 - 77)  BP: 111/60 (04-26-21 @ 05:00) (106/60 - 122/72)  RR: 18 (04-26-21 @ 05:00) (17 - 18)  SpO2: 99% (04-26-21 @ 05:00) (95% - 99%) on (O2)    Telemetry/Alarms: tele SR  General: WN/WD NAD  Neurology: Awake, nonfocal, TANNER x 4  Eyes: Scleras clear, PERRLA/ EOMI, Gross vision intact  ENT:Gross hearing intact, grossly patent pharynx, no stridor  Neck: Neck supple, trachea midline, No JVD,   Respiratory: DEc BS right mid to apex  CV: RRR, S1S2, no murmurs, rubs or gallops  Abdominal: Soft, NT, ND +BS, +BM  Extremities: No edema, + peripheral pulses  Skin: No Rashes, Hematoma, Ecchymosis  Lymphatic: No Neck, axilla, groin LAD  Psych: Oriented x 3, normal affect  Incisions: Rt VATS c/d/i.   Tubes: Rt. CT-140cc/24hrs, passive/expir AL. on Suction.   Relevant labs, radiology and Medications reviewed  CXR with stable Rt Apical ptx/space          MEDICATIONS  (STANDING):  acetaminophen  IVPB .. 1000 milliGRAM(s) IV Intermittent once  heparin   Injectable 5000 Unit(s) SubCutaneous every 8 hours  hydrochlorothiazide 25 milliGRAM(s) Oral daily  levothyroxine 100 MICROGram(s) Oral daily  lidocaine   Patch 1 Patch Transdermal every 24 hours  loratadine 10 milliGRAM(s) Oral daily  nicotine - 21 mG/24Hr(s) Patch 1 patch Transdermal daily    MEDICATIONS  (PRN):  naloxone Injectable 0.1 milliGRAM(s) IV Push every 3 minutes PRN For ANY of the following changes in patient status:  A. RR LESS THAN 10 breaths per minute, B. Oxygen saturation LESS THAN 90%, C. Sedation score of 6  traMADol 25 milliGRAM(s) Oral every 6 hours PRN Moderate to Severe Pain (4 - 10)    Pertinent Physical Exam  I&O's Summary    25 Apr 2021 07:01  -  26 Apr 2021 07:00  --------------------------------------------------------  IN: 0 mL / OUT: 140 mL / NET: -140 mL        Assessment  66y Female  w/ PAST MEDICAL & SURGICAL HISTORY:  HTN (hypertension)    Hypothyroid    HTN (hypertension)    H/O myomectomy    admitted with complaints of Patient is a 66y old  Female who presents with a chief complaint of Lung surgery (24 Apr 2021 10:14)  ..Patient is a 66y old  Female s/p Robot-assisted lobectomy of right lung with video-assisted thoracoscopic surgery (VATS) RUL on 4/21/2021  Postoperative course/issues: postop chest tube remains on suction with a passive expiratory air leak.   4/25- Failed waterseal trial, back to suction.   PLAN  Neuro: Pain management. Cont current regiment.   Pulm: Encourage coughing, deep breathing and use of incentive spirometry. Wean off supplemental oxygen as able. Daily CXR.   Cardio: Monitor telemetry/alarms  GI: Tolerating diet. Continue stool softeners.  Renal: monitor urine output, supplement electrolytes as needed  Vasc: Heparin SC/SCDs for DVT prophylaxis  Heme: Stable H/H. .   ID: Off antibiotics. Stable.  Therapy: OOB/ambulate  Tubes: Monitor Chest tube output and air leak. AS per Dr. Adam, will place a mini atrium to waterseal and repeat CXR after 4 hours.   Disposition: Aim to D/C to home with CT to mini atrium if CXR stable in next 24hrs.    Discussed with Cardiothoracic Team at AM rounds.

## 2021-04-26 NOTE — PROGRESS NOTE ADULT - NUTRITIONAL ASSESSMENT
This patient has been assessed with a concern for Malnutrition and has been determined to have a diagnosis/diagnoses of Moderate protein-calorie malnutrition.    This patient is being managed with:   Diet Regular-  Entered: Apr 22 2021  7:41AM    

## 2021-04-26 NOTE — PROGRESS NOTE ADULT - PROVIDER SPECIALTY LIST ADULT
Pain Medicine
Thoracic Surgery
Thoracic Surgery
CT Surgery
Critical Care
Pain Medicine
Anesthesia
CT Surgery
Critical Care
Pain Medicine

## 2021-04-27 ENCOUNTER — TRANSCRIPTION ENCOUNTER (OUTPATIENT)
Age: 66
End: 2021-04-27

## 2021-04-27 VITALS
OXYGEN SATURATION: 98 % | TEMPERATURE: 98 F | HEART RATE: 78 BPM | SYSTOLIC BLOOD PRESSURE: 117 MMHG | DIASTOLIC BLOOD PRESSURE: 81 MMHG | RESPIRATION RATE: 17 BRPM

## 2021-04-27 PROCEDURE — 71045 X-RAY EXAM CHEST 1 VIEW: CPT | Mod: 26

## 2021-04-27 RX ORDER — TRAMADOL HYDROCHLORIDE 50 MG/1
1 TABLET ORAL
Qty: 20 | Refills: 0
Start: 2021-04-27 | End: 2021-05-01

## 2021-04-27 RX ORDER — ACETAMINOPHEN 500 MG
1000 TABLET ORAL ONCE
Refills: 0 | Status: COMPLETED | OUTPATIENT
Start: 2021-04-27 | End: 2021-04-27

## 2021-04-27 RX ORDER — NICOTINE POLACRILEX 2 MG
1 GUM BUCCAL
Qty: 30 | Refills: 0
Start: 2021-04-27 | End: 2021-05-26

## 2021-04-27 RX ADMIN — Medication 1000 MILLIGRAM(S): at 09:45

## 2021-04-27 RX ADMIN — Medication 1 PATCH: at 11:53

## 2021-04-27 RX ADMIN — LORATADINE 10 MILLIGRAM(S): 10 TABLET ORAL at 11:51

## 2021-04-27 RX ADMIN — Medication 1 PATCH: at 08:00

## 2021-04-27 RX ADMIN — Medication 100 MICROGRAM(S): at 05:45

## 2021-04-27 RX ADMIN — HEPARIN SODIUM 5000 UNIT(S): 5000 INJECTION INTRAVENOUS; SUBCUTANEOUS at 05:45

## 2021-04-27 RX ADMIN — Medication 1 PATCH: at 11:51

## 2021-04-27 RX ADMIN — Medication 400 MILLIGRAM(S): at 09:24

## 2021-04-27 RX ADMIN — LIDOCAINE 1 PATCH: 4 CREAM TOPICAL at 02:00

## 2021-04-27 NOTE — DISCHARGE NOTE PROVIDER - NSDCCPTREATMENT_GEN_ALL_CORE_FT
PRINCIPAL PROCEDURE  Procedure: Robot-assisted lobectomy of right lung with video-assisted thoracoscopic surgery (VATS)  Findings and Treatment:

## 2021-04-27 NOTE — DISCHARGE NOTE NURSING/CASE MANAGEMENT/SOCIAL WORK - PATIENT PORTAL LINK FT
You can access the FollowMyHealth Patient Portal offered by [facility] by registering at the following website: [email or web address]. By joining [facility]’s FollowMyHealth portal, you will also be able to view your health information using other applications (apps) compatible with our system.

## 2021-04-27 NOTE — DISCHARGE NOTE PROVIDER - NSDCFUADDINST_GEN_ALL_CORE_FT
Keep the chest tube site clean and watch for pus, increased redness, fevers, or difficulty breathing and if noted, call Dr Adam.  Take care of the mini atrium as you were instructed.   Keep the chest tube site clean and watch for pus, increased redness, fevers, or difficulty breathing and if noted, call Dr Adam.  Take care of the mini atrium as you were instructed.  Visting nurse to change Chest tube site dressing daily with clean gauze and tap. And they will empty mini atrium with provided luer lock syringe if it becomes full.     Keep chest tube site completely dry. Avoid showering at this time.

## 2021-04-27 NOTE — DISCHARGE NOTE PROVIDER - NSDCFUADDAPPT_GEN_ALL_CORE_FT
See Dr Adam in two weeks- call for an appointment and bring a new chest X-ray when you come.   See Dr Adam next Thursday 5/6/21 in office. Call to confirm apt [phone]  Bring chest xray prescription with you to your apt and office will help coordinate your chest xray for day of your office visit  Follow up with PCP in 2-3 weeks.

## 2021-04-27 NOTE — DISCHARGE NOTE PROVIDER - HOSPITAL COURSE
65 y/o F with malignant lung neoplasm seen on a routine CT scan done for a h/o smoking  underwent a FB, right VATS, robotic assisted right upper lobectomy, mediastinal LN dissection, pneumolysis on 4/21/21. Her post-op course was complicated by an air leak but she was able to tolerate a mini atrium so that she could be discharged with the tube for follow up with Dr Adam. 65 y/o F with malignant lung neoplasm seen on a routine CT scan done for a h/o smoking  underwent a FB, right VATS, robotic assisted right upper lobectomy, mediastinal LN dissection, pneumolysis on 4/21/21. Her post-op course was complicated by a large air leak. Failed Waterseal on 4/25. On 4/26 a mini atrium was placed. FU CXR yesterday and today had showed ptx increased in size, however Dr. Adam   satisfied with results and pt asymptomatic. Per Dr. Adam, ok for pt to stay on mini atrium and be d/c'd to home today w VNS. All home care arranged. pt feels well. C/o pain at CT insertion site  Np CP or SOB. Amb ad bashir frequently. VATS c/d/i. Given supplies and teaching for mini atrium at home.   Vital Signs Last 24 Hrs  T(C): 36.8 (27 Apr 2021 11:46), Max: 37.2 (26 Apr 2021 16:18)  T(F): 98.3 (27 Apr 2021 11:46), Max: 99 (26 Apr 2021 16:18)  HR: 78 (27 Apr 2021 11:46) (70 - 83)  BP: 117/81 (27 Apr 2021 11:46) (104/62 - 119/60)  BP(mean): --  RR: 17 (27 Apr 2021 11:46) (17 - 18)  SpO2: 98% (27 Apr 2021 11:46) (95% - 99%)

## 2021-04-27 NOTE — DISCHARGE NOTE PROVIDER - DETAILS OF MALNUTRITION DIAGNOSIS/DIAGNOSES
This patient has been assessed with a concern for Malnutrition and was treated during this hospitalization for the following Nutrition diagnosis/diagnoses:     -  04/22/2021: Moderate protein-calorie malnutrition

## 2021-04-27 NOTE — DISCHARGE NOTE NURSING/CASE MANAGEMENT/SOCIAL WORK - NSDCPNINST_GEN_ALL_CORE
Call 911 for chest pain, and/or difficulty breathing. Report to your doctor any fever, pus to incision.

## 2021-04-27 NOTE — DISCHARGE NOTE PROVIDER - NSDCMRMEDTOKEN_GEN_ALL_CORE_FT
Claritin 10 mg oral tablet: 1 tab(s) orally once a day  Garlic oral capsule: 3000 milligram(s) orally once a day  hydroCHLOROthiazide 25 mg oral tablet: 1 tab(s) orally once a day  levothyroxine 100 mcg (0.1 mg) oral tablet: 1 tab(s) orally once a day  Probiotic Formula oral capsule: 1 cap(s) orally once a day  Vitamin D3 1000 intl units (25 mcg) oral tablet: orally once a day   Advil 200 mg oral tablet: 2 tab(s) orally every 6 hours, As Needed or can take Aleve if you prefer  Claritin 10 mg oral tablet: 1 tab(s) orally once a day  Garlic oral capsule: 3000 milligram(s) orally once a day  hydroCHLOROthiazide 25 mg oral tablet: 1 tab(s) orally once a day  levothyroxine 100 mcg (0.1 mg) oral tablet: 1 tab(s) orally once a day  nicotine 21 mg/24 hr transdermal film, extended release: 1 patch transdermal once a day  Probiotic Formula oral capsule: 1 cap(s) orally once a day  traMADol 50 mg oral tablet: 1 tab(s) orally every 6 hours, As Needed -Moderate to Severe Pain (4 - 10) MDD:4  Tylenol 325 mg oral tablet: 2 tab(s) orally every 6 hours, As Needed  Vitamin D3 1000 intl units (25 mcg) oral tablet: orally once a day

## 2021-04-27 NOTE — DISCHARGE NOTE PROVIDER - NSDCCPCAREPLAN_GEN_ALL_CORE_FT
PRINCIPAL DISCHARGE DIAGNOSIS  Diagnosis: Malignant neoplasm of unspecified part of unspecified bronchus or lung  Assessment and Plan of Treatment:

## 2021-04-27 NOTE — DISCHARGE NOTE NURSING/CASE MANAGEMENT/SOCIAL WORK - NSSCNAMETXT_GEN_ALL_CORE
[facility] [place] [phone] initial visit will be day after discharge home. A nurse will call prior to the home visit.

## 2021-04-27 NOTE — DISCHARGE NOTE PROVIDER - CARE PROVIDER_API CALL
Willie Adam (MD)  Surgery; Thoracic Surgery  [phone] [place] [address]  Phone: [phone]  Fax: [phone]  Established Patient  Follow Up Time:

## 2021-04-27 NOTE — DISCHARGE NOTE PROVIDER - NSDCACTIVITY_GEN_ALL_CORE
Sex allowed/Do not drive or operate machinery/Showering allowed/Do not make important decisions/Stairs allowed/Walking - Indoors allowed/No heavy lifting/straining Sex allowed/Do not drive or operate machinery/Do not make important decisions/Stairs allowed/Walking - Indoors allowed/No heavy lifting/straining

## 2021-04-28 ENCOUNTER — NON-APPOINTMENT (OUTPATIENT)
Age: 66
End: 2021-04-28

## 2021-04-30 ENCOUNTER — NON-APPOINTMENT (OUTPATIENT)
Age: 66
End: 2021-04-30

## 2021-05-06 ENCOUNTER — NON-APPOINTMENT (OUTPATIENT)
Age: 66
End: 2021-05-06

## 2021-05-06 ENCOUNTER — OUTPATIENT (OUTPATIENT)
Dept: OUTPATIENT SERVICES | Facility: HOSPITAL | Age: 66
LOS: 1 days | End: 2021-05-06
Payer: COMMERCIAL

## 2021-05-06 ENCOUNTER — APPOINTMENT (OUTPATIENT)
Dept: THORACIC SURGERY | Facility: CLINIC | Age: 66
End: 2021-05-06
Payer: COMMERCIAL

## 2021-05-06 ENCOUNTER — RESULT REVIEW (OUTPATIENT)
Age: 66
End: 2021-05-06

## 2021-05-06 ENCOUNTER — APPOINTMENT (OUTPATIENT)
Dept: RADIOLOGY | Facility: HOSPITAL | Age: 66
End: 2021-05-06

## 2021-05-06 VITALS
BODY MASS INDEX: 22.51 KG/M2 | TEMPERATURE: 98.5 F | HEIGHT: 69 IN | WEIGHT: 152 LBS | HEART RATE: 71 BPM | DIASTOLIC BLOOD PRESSURE: 80 MMHG | RESPIRATION RATE: 17 BRPM | SYSTOLIC BLOOD PRESSURE: 132 MMHG | OXYGEN SATURATION: 96 %

## 2021-05-06 DIAGNOSIS — Z98.890 OTHER SPECIFIED POSTPROCEDURAL STATES: Chronic | ICD-10-CM

## 2021-05-06 DIAGNOSIS — J93.9 PNEUMOTHORAX, UNSPECIFIED: ICD-10-CM

## 2021-05-06 PROCEDURE — 99024 POSTOP FOLLOW-UP VISIT: CPT

## 2021-05-06 PROCEDURE — 71046 X-RAY EXAM CHEST 2 VIEWS: CPT | Mod: 26

## 2021-05-07 ENCOUNTER — NON-APPOINTMENT (OUTPATIENT)
Age: 66
End: 2021-05-07

## 2021-05-10 ENCOUNTER — TRANSCRIPTION ENCOUNTER (OUTPATIENT)
Age: 66
End: 2021-05-10

## 2021-05-10 ENCOUNTER — APPOINTMENT (OUTPATIENT)
Dept: RADIOLOGY | Facility: HOSPITAL | Age: 66
End: 2021-05-10

## 2021-05-10 ENCOUNTER — RESULT REVIEW (OUTPATIENT)
Age: 66
End: 2021-05-10

## 2021-05-10 ENCOUNTER — APPOINTMENT (OUTPATIENT)
Dept: THORACIC SURGERY | Facility: CLINIC | Age: 66
End: 2021-05-10
Payer: COMMERCIAL

## 2021-05-10 ENCOUNTER — OUTPATIENT (OUTPATIENT)
Dept: OUTPATIENT SERVICES | Facility: HOSPITAL | Age: 66
LOS: 1 days | End: 2021-05-10
Payer: COMMERCIAL

## 2021-05-10 VITALS
BODY MASS INDEX: 23.55 KG/M2 | WEIGHT: 159 LBS | HEIGHT: 69 IN | HEART RATE: 95 BPM | OXYGEN SATURATION: 99 % | DIASTOLIC BLOOD PRESSURE: 75 MMHG | SYSTOLIC BLOOD PRESSURE: 143 MMHG | RESPIRATION RATE: 18 BRPM | TEMPERATURE: 98.3 F

## 2021-05-10 DIAGNOSIS — Z98.890 OTHER SPECIFIED POSTPROCEDURAL STATES: Chronic | ICD-10-CM

## 2021-05-10 DIAGNOSIS — J93.9 PNEUMOTHORAX, UNSPECIFIED: ICD-10-CM

## 2021-05-10 PROCEDURE — 71046 X-RAY EXAM CHEST 2 VIEWS: CPT | Mod: 26,76

## 2021-05-10 PROCEDURE — 99024 POSTOP FOLLOW-UP VISIT: CPT

## 2021-05-13 ENCOUNTER — APPOINTMENT (OUTPATIENT)
Dept: INTERNAL MEDICINE | Facility: CLINIC | Age: 66
End: 2021-05-13
Payer: COMMERCIAL

## 2021-05-13 VITALS
HEART RATE: 77 BPM | OXYGEN SATURATION: 98 % | SYSTOLIC BLOOD PRESSURE: 127 MMHG | HEIGHT: 69 IN | WEIGHT: 153 LBS | DIASTOLIC BLOOD PRESSURE: 75 MMHG | BODY MASS INDEX: 22.66 KG/M2 | TEMPERATURE: 97 F

## 2021-05-13 PROCEDURE — 99214 OFFICE O/P EST MOD 30 MIN: CPT

## 2021-05-13 PROCEDURE — 99072 ADDL SUPL MATRL&STAF TM PHE: CPT

## 2021-05-13 NOTE — ASSESSMENT
[FreeTextEntry1] : Patient is a 66-year-old female with history of smoking, had no lung cancer status post resection, hypothyroidism, hypertension, prediabetes, former alcohol and drug abuser, vitamin D deficiency who presents for follow-up of surgery\par \par 1. Adenocarcinoma of the lung status post treatment\par stage 1A2\par follow-up with thoracic surgery\par doing well\par \par 2 postoperative pain\par continue Tylenol 5 mg two tablets three times a day and Aleve two tablets BID\par May use Salon pas patches\par \par 3. Constipation\par start Senokot, MiraLAX and Colace\par observe\par follow-up in one month\par

## 2021-05-13 NOTE — PHYSICAL EXAM
[Normal Sclera/Conjunctiva] : normal sclera/conjunctiva [Normal Outer Ear/Nose] : the outer ears and nose were normal in appearance [Normal] : no CVA or spinal tenderness [de-identified] : Tenderness over eighth ninth ribs with scars healing well

## 2021-05-13 NOTE — HISTORY OF PRESENT ILLNESS
[FreeTextEntry1] : Follow-up for lung cancer surgery [de-identified] : \par \par The patient is a 66-year-old former smoker, history of drug and alcohol abuse, cervical radiculopathy history of chronic viral hepatitis C status post treatment, hypertension, hypothyroidism, prediabetes, emphysema, vitamin D insufficiency who presents for follow-up of hospitalization for right upper lobe pneumonectomy with lysis of adhesions. Patient underwent video assisted right upper lobe resection with lymph node biopsy stage 1A2 I now presents for follow-up. Patient complains of severe right sided rib pain associated with constipation denies shortness of breath palpitation fever chills headache cold or heat intolerance

## 2021-05-19 ENCOUNTER — TRANSCRIPTION ENCOUNTER (OUTPATIENT)
Age: 66
End: 2021-05-19

## 2021-05-20 ENCOUNTER — OUTPATIENT (OUTPATIENT)
Dept: OUTPATIENT SERVICES | Facility: HOSPITAL | Age: 66
LOS: 1 days | End: 2021-05-20

## 2021-05-20 ENCOUNTER — APPOINTMENT (OUTPATIENT)
Dept: RADIOLOGY | Facility: HOSPITAL | Age: 66
End: 2021-05-20
Payer: COMMERCIAL

## 2021-05-20 ENCOUNTER — RESULT REVIEW (OUTPATIENT)
Age: 66
End: 2021-05-20

## 2021-05-20 ENCOUNTER — APPOINTMENT (OUTPATIENT)
Dept: THORACIC SURGERY | Facility: CLINIC | Age: 66
End: 2021-05-20
Payer: COMMERCIAL

## 2021-05-20 VITALS
OXYGEN SATURATION: 97 % | DIASTOLIC BLOOD PRESSURE: 77 MMHG | SYSTOLIC BLOOD PRESSURE: 124 MMHG | HEIGHT: 69 IN | HEART RATE: 73 BPM | TEMPERATURE: 98.4 F | BODY MASS INDEX: 22.51 KG/M2 | WEIGHT: 152 LBS

## 2021-05-20 DIAGNOSIS — J93.9 PNEUMOTHORAX, UNSPECIFIED: ICD-10-CM

## 2021-05-20 DIAGNOSIS — Z98.890 OTHER SPECIFIED POSTPROCEDURAL STATES: Chronic | ICD-10-CM

## 2021-05-20 DIAGNOSIS — C34.90 MALIGNANT NEOPLASM OF UNSPECIFIED PART OF UNSPECIFIED BRONCHUS OR LUNG: ICD-10-CM

## 2021-05-20 PROCEDURE — 99024 POSTOP FOLLOW-UP VISIT: CPT

## 2021-05-20 PROCEDURE — 71046 X-RAY EXAM CHEST 2 VIEWS: CPT | Mod: 26

## 2021-06-08 ENCOUNTER — APPOINTMENT (OUTPATIENT)
Dept: PHYSICAL MEDICINE AND REHAB | Facility: CLINIC | Age: 66
End: 2021-06-08

## 2021-06-14 ENCOUNTER — APPOINTMENT (OUTPATIENT)
Dept: INTERNAL MEDICINE | Facility: CLINIC | Age: 66
End: 2021-06-14

## 2021-06-17 ENCOUNTER — APPOINTMENT (OUTPATIENT)
Dept: INTERNAL MEDICINE | Facility: CLINIC | Age: 66
End: 2021-06-17
Payer: COMMERCIAL

## 2021-06-17 ENCOUNTER — LABORATORY RESULT (OUTPATIENT)
Age: 66
End: 2021-06-17

## 2021-06-17 VITALS
BODY MASS INDEX: 22.07 KG/M2 | SYSTOLIC BLOOD PRESSURE: 140 MMHG | DIASTOLIC BLOOD PRESSURE: 78 MMHG | OXYGEN SATURATION: 97 % | WEIGHT: 149 LBS | HEART RATE: 83 BPM | TEMPERATURE: 98 F | HEIGHT: 69 IN

## 2021-06-17 VITALS — SYSTOLIC BLOOD PRESSURE: 138 MMHG | DIASTOLIC BLOOD PRESSURE: 88 MMHG

## 2021-06-17 PROCEDURE — 99072 ADDL SUPL MATRL&STAF TM PHE: CPT

## 2021-06-17 PROCEDURE — 99214 OFFICE O/P EST MOD 30 MIN: CPT

## 2021-06-17 NOTE — PHYSICAL EXAM
[Normal Sclera/Conjunctiva] : normal sclera/conjunctiva [Normal Outer Ear/Nose] : the outer ears and nose were normal in appearance [Non Tender] : non-tender [Normal] : soft, non-tender, non-distended, no masses palpated, no HSM and normal bowel sounds [de-identified] : Surgical scars healing well no redness no erythema induration none

## 2021-06-17 NOTE — REVIEW OF SYSTEMS
[Abdominal Pain] : abdominal pain [Nausea] : no nausea [Constipation] : constipation [Diarrhea] : diarrhea [Vomiting] : no vomiting [Heartburn] : no heartburn [Melena] : no melena [Negative] : Genitourinary

## 2021-06-17 NOTE — HISTORY OF PRESENT ILLNESS
[FreeTextEntry1] : Pain [de-identified] : The patient is a 66-year-old female with history of smoking, continues to smoke two packs a day, adenocarcinoma right lung status post surgery, hypertension, hypothyroidism, prediabetes, emphysema, cervical radiculopathy allergic rhinitis. Patient presently smoking two cigarettes a day complains of 8/10 right sided pain radiating with swelling and he denies fever, chills, shortness of breath palpitations.

## 2021-06-25 LAB
ANION GAP SERPL CALC-SCNC: 19 MMOL/L
BASOPHILS # BLD AUTO: 0.05 K/UL
BASOPHILS NFR BLD AUTO: 0.9 %
BUN SERPL-MCNC: 11 MG/DL
CALCIUM SERPL-MCNC: 10 MG/DL
CHLORIDE SERPL-SCNC: 103 MMOL/L
CO2 SERPL-SCNC: 18 MMOL/L
CREAT SERPL-MCNC: 0.63 MG/DL
EOSINOPHIL # BLD AUTO: 0.33 K/UL
EOSINOPHIL NFR BLD AUTO: 6.3 %
GLUCOSE SERPL-MCNC: 85 MG/DL
HCT VFR BLD CALC: 47.7 %
HGB BLD-MCNC: 14.8 G/DL
IMM GRANULOCYTES NFR BLD AUTO: 0.4 %
LYMPHOCYTES # BLD AUTO: 1.88 K/UL
LYMPHOCYTES NFR BLD AUTO: 35.7 %
MAN DIFF?: NORMAL
MCHC RBC-ENTMCNC: 29 PG
MCHC RBC-ENTMCNC: 31 GM/DL
MCV RBC AUTO: 93.5 FL
MONOCYTES # BLD AUTO: 0.43 K/UL
MONOCYTES NFR BLD AUTO: 8.2 %
NEUTROPHILS # BLD AUTO: 2.56 K/UL
NEUTROPHILS NFR BLD AUTO: 48.5 %
PLATELET # BLD AUTO: NORMAL K/UL
POTASSIUM SERPL-SCNC: 5.7 MMOL/L
RBC # BLD: 5.1 M/UL
RBC # FLD: 13.4 %
SODIUM SERPL-SCNC: 140 MMOL/L
T4 FREE SERPL-MCNC: 1.6 NG/DL
TSH SERPL-ACNC: 1.79 UIU/ML
WBC # FLD AUTO: 5.27 K/UL

## 2021-07-08 ENCOUNTER — TRANSCRIPTION ENCOUNTER (OUTPATIENT)
Age: 66
End: 2021-07-08

## 2021-07-19 ENCOUNTER — OUTPATIENT (OUTPATIENT)
Dept: OUTPATIENT SERVICES | Facility: HOSPITAL | Age: 66
LOS: 1 days | End: 2021-07-19
Payer: COMMERCIAL

## 2021-07-19 ENCOUNTER — APPOINTMENT (OUTPATIENT)
Dept: ULTRASOUND IMAGING | Facility: IMAGING CENTER | Age: 66
End: 2021-07-19

## 2021-07-19 ENCOUNTER — APPOINTMENT (OUTPATIENT)
Dept: ULTRASOUND IMAGING | Facility: IMAGING CENTER | Age: 66
End: 2021-07-19
Payer: COMMERCIAL

## 2021-07-19 DIAGNOSIS — Z98.890 OTHER SPECIFIED POSTPROCEDURAL STATES: Chronic | ICD-10-CM

## 2021-07-19 DIAGNOSIS — G89.18 OTHER ACUTE POSTPROCEDURAL PAIN: ICD-10-CM

## 2021-07-19 PROCEDURE — 76700 US EXAM ABDOM COMPLETE: CPT | Mod: 26

## 2021-07-19 PROCEDURE — 76700 US EXAM ABDOM COMPLETE: CPT

## 2021-07-22 ENCOUNTER — APPOINTMENT (OUTPATIENT)
Dept: THORACIC SURGERY | Facility: CLINIC | Age: 66
End: 2021-07-22
Payer: COMMERCIAL

## 2021-07-22 ENCOUNTER — APPOINTMENT (OUTPATIENT)
Dept: INTERNAL MEDICINE | Facility: CLINIC | Age: 66
End: 2021-07-22

## 2021-07-22 ENCOUNTER — RESULT REVIEW (OUTPATIENT)
Age: 66
End: 2021-07-22

## 2021-07-22 ENCOUNTER — OUTPATIENT (OUTPATIENT)
Dept: OUTPATIENT SERVICES | Facility: HOSPITAL | Age: 66
LOS: 1 days | End: 2021-07-22
Payer: COMMERCIAL

## 2021-07-22 ENCOUNTER — APPOINTMENT (OUTPATIENT)
Dept: RADIOLOGY | Facility: HOSPITAL | Age: 66
End: 2021-07-22

## 2021-07-22 VITALS
DIASTOLIC BLOOD PRESSURE: 94 MMHG | SYSTOLIC BLOOD PRESSURE: 172 MMHG | HEIGHT: 69 IN | TEMPERATURE: 98.8 F | HEART RATE: 78 BPM | BODY MASS INDEX: 21.62 KG/M2 | WEIGHT: 146 LBS | OXYGEN SATURATION: 97 %

## 2021-07-22 DIAGNOSIS — Z98.890 OTHER SPECIFIED POSTPROCEDURAL STATES: Chronic | ICD-10-CM

## 2021-07-22 DIAGNOSIS — C34.90 MALIGNANT NEOPLASM OF UNSPECIFIED PART OF UNSPECIFIED BRONCHUS OR LUNG: ICD-10-CM

## 2021-07-22 PROCEDURE — 99212 OFFICE O/P EST SF 10 MIN: CPT

## 2021-07-22 PROCEDURE — 71046 X-RAY EXAM CHEST 2 VIEWS: CPT | Mod: 26

## 2021-07-22 PROCEDURE — 99072 ADDL SUPL MATRL&STAF TM PHE: CPT

## 2021-07-22 RX ORDER — OMEGA-3/DHA/EPA/FISH OIL 300-1000MG
1000 CAPSULE ORAL DAILY
Refills: 0 | Status: DISCONTINUED | COMMUNITY
Start: 2021-02-11 | End: 2021-07-22

## 2021-07-22 RX ORDER — NICOTINE 21 MG/24HR
21 PATCH, TRANSDERMAL 24 HOURS TRANSDERMAL DAILY
Qty: 21 | Refills: 0 | Status: DISCONTINUED | COMMUNITY
Start: 2021-03-29 | End: 2021-07-22

## 2021-07-22 RX ORDER — LIDOCAINE 5% 700 MG/1
5 PATCH TOPICAL
Qty: 1 | Refills: 0 | Status: DISCONTINUED | COMMUNITY
Start: 2021-06-14 | End: 2021-07-22

## 2021-07-22 RX ORDER — TRAMADOL HYDROCHLORIDE 50 MG/1
50 TABLET, COATED ORAL
Qty: 15 | Refills: 0 | Status: DISCONTINUED | COMMUNITY
Start: 2021-05-14 | End: 2021-07-22

## 2021-07-22 RX ORDER — TRAMADOL HYDROCHLORIDE 50 MG/1
50 TABLET, COATED ORAL AT BEDTIME
Qty: 5 | Refills: 0 | Status: DISCONTINUED | COMMUNITY
Start: 2021-06-01 | End: 2021-07-22

## 2021-07-22 RX ORDER — GABAPENTIN 100 MG/1
100 CAPSULE ORAL
Qty: 30 | Refills: 0 | Status: DISCONTINUED | COMMUNITY
Start: 2021-05-20 | End: 2021-07-22

## 2021-07-22 RX ORDER — MELOXICAM 15 MG/1
15 TABLET ORAL
Qty: 30 | Refills: 1 | Status: DISCONTINUED | COMMUNITY
Start: 2021-06-17 | End: 2021-07-22

## 2021-07-22 RX ORDER — TRAMADOL HYDROCHLORIDE 50 MG/1
50 TABLET, COATED ORAL
Qty: 9 | Refills: 0 | Status: DISCONTINUED | COMMUNITY
Start: 2021-05-07 | End: 2021-07-22

## 2021-07-22 RX ORDER — GABAPENTIN 100 MG/1
100 CAPSULE ORAL TWICE DAILY
Qty: 60 | Refills: 3 | Status: DISCONTINUED | COMMUNITY
Start: 2021-06-01 | End: 2021-07-22

## 2021-08-09 ENCOUNTER — RX RENEWAL (OUTPATIENT)
Age: 66
End: 2021-08-09

## 2021-08-24 ENCOUNTER — NON-APPOINTMENT (OUTPATIENT)
Age: 66
End: 2021-08-24

## 2021-10-26 ENCOUNTER — APPOINTMENT (OUTPATIENT)
Dept: RADIOLOGY | Facility: IMAGING CENTER | Age: 66
End: 2021-10-26
Payer: COMMERCIAL

## 2021-10-26 ENCOUNTER — OUTPATIENT (OUTPATIENT)
Dept: OUTPATIENT SERVICES | Facility: HOSPITAL | Age: 66
LOS: 1 days | End: 2021-10-26
Payer: COMMERCIAL

## 2021-10-26 ENCOUNTER — APPOINTMENT (OUTPATIENT)
Dept: INTERNAL MEDICINE | Facility: CLINIC | Age: 66
End: 2021-10-26
Payer: COMMERCIAL

## 2021-10-26 ENCOUNTER — NON-APPOINTMENT (OUTPATIENT)
Age: 66
End: 2021-10-26

## 2021-10-26 VITALS
WEIGHT: 150 LBS | DIASTOLIC BLOOD PRESSURE: 83 MMHG | HEIGHT: 69 IN | HEART RATE: 82 BPM | BODY MASS INDEX: 22.22 KG/M2 | SYSTOLIC BLOOD PRESSURE: 124 MMHG | OXYGEN SATURATION: 97 % | TEMPERATURE: 97.3 F

## 2021-10-26 DIAGNOSIS — C34.90 MALIGNANT NEOPLASM OF UNSPECIFIED PART OF UNSPECIFIED BRONCHUS OR LUNG: ICD-10-CM

## 2021-10-26 DIAGNOSIS — G89.18 OTHER ACUTE POSTPROCEDURAL PAIN: ICD-10-CM

## 2021-10-26 DIAGNOSIS — Z98.890 OTHER SPECIFIED POSTPROCEDURAL STATES: Chronic | ICD-10-CM

## 2021-10-26 PROCEDURE — 73502 X-RAY EXAM HIP UNI 2-3 VIEWS: CPT | Mod: 26,RT

## 2021-10-26 PROCEDURE — 73502 X-RAY EXAM HIP UNI 2-3 VIEWS: CPT

## 2021-10-26 PROCEDURE — G0008: CPT

## 2021-10-26 PROCEDURE — 71100 X-RAY EXAM RIBS UNI 2 VIEWS: CPT | Mod: 26

## 2021-10-26 PROCEDURE — 90686 IIV4 VACC NO PRSV 0.5 ML IM: CPT

## 2021-10-26 PROCEDURE — 99214 OFFICE O/P EST MOD 30 MIN: CPT | Mod: 25

## 2021-10-26 PROCEDURE — 71100 X-RAY EXAM RIBS UNI 2 VIEWS: CPT

## 2021-10-26 NOTE — HISTORY OF PRESENT ILLNESS
[FreeTextEntry1] : Patient a half-hour late for CPE changed appointment to acute for right sided rib pain and right hip pain. [de-identified] : Patient is a 66-year-old female with history of long-standing smoking currently smokes less than a five cigarettes a day diagnosed with adenoma lung cancer status post resection, hypothyroidism, former alcohol and drug abuser, history of hep C status post treatment in remission, hypertension, hypothyroidism, prediabetes, history of thyroid nodule vitamin D insufficiency who presents with approximately two weeks history of severe right sided rib pain and right hip pain at the city starting new job which requires lifting she denies nausea vomiting diarrhea fever chills redness patient says area swells up has been using over-the-counter Lidoderm patches

## 2021-10-26 NOTE — REVIEW OF SYSTEMS
[Muscle Pain] : muscle pain [Negative] : Neurological [Joint Pain] : no joint pain [Joint Stiffness] : no joint stiffness [Joint Swelling] : no joint swelling [Muscle Weakness] : no muscle weakness [Back Pain] : no back pain

## 2021-10-26 NOTE — ASSESSMENT
[FreeTextEntry1] : Patient is a 66-year-old female with history of smoking, adenocarcinoma lung status post probiotic resection former drug and alcohol abuser, hypertension, hypothyroidism, positive LANEY, prediabetes, pulmonary emphysema who presents with severe right sided rib and right pelvic pain\par \par 1 severe right sided rib pain/pelvic pain\par etiology unclear however rule out metastases\par check x-rays of right ribs and right hip and pelvis\par continue over-the-counter Lidoderm patch and and Aleve two tablets twice a day\par follow-up in 2 to 4 weeks\par \par 2 history of adenocarcinoma lung\par status post robotic resection\par patient continues to smoke quarter pack per day counseled on cessation\par \par 3. Hypothyroidism\par continue levothyroxine hundred micrograms\par clinically and laboratory you thyroid\par \par 4 hypertension\par continue hydrochlorothiazide 25 mg once a day\par \par 5 history of highly positive LANEY\par 1: 2560 speckled panel\par consider further workup.\par \par 6. Health maintenance\par patient received COVID vaccine and flu shot today

## 2021-10-26 NOTE — PHYSICAL EXAM
[Normal Sclera/Conjunctiva] : normal sclera/conjunctiva [Normal Outer Ear/Nose] : the outer ears and nose were normal in appearance [Normal] : normal gait, coordination grossly intact, no focal deficits and deep tendon reflexes were 2+ and symmetric [de-identified] : Tenderness over the right eighth the 12th rib [de-identified] : No abdominal pain [de-identified] : Tenderness around right iliac crest

## 2021-10-27 LAB
ALBUMIN SERPL ELPH-MCNC: 4.4 G/DL
ALP BLD-CCNC: 71 U/L
ALT SERPL-CCNC: 15 U/L
ANION GAP SERPL CALC-SCNC: 12 MMOL/L
AST SERPL-CCNC: 34 U/L
BASOPHILS # BLD AUTO: 0.03 K/UL
BASOPHILS NFR BLD AUTO: 0.5 %
BILIRUB SERPL-MCNC: 0.3 MG/DL
BUN SERPL-MCNC: 10 MG/DL
CALCIUM SERPL-MCNC: 10.1 MG/DL
CHLORIDE SERPL-SCNC: 104 MMOL/L
CO2 SERPL-SCNC: 20 MMOL/L
CREAT SERPL-MCNC: 0.67 MG/DL
CRP SERPL-MCNC: <3 MG/L
EOSINOPHIL # BLD AUTO: 0.31 K/UL
EOSINOPHIL NFR BLD AUTO: 5.5 %
GLUCOSE SERPL-MCNC: 109 MG/DL
HCT VFR BLD CALC: 50.5 %
HGB BLD-MCNC: 15.7 G/DL
IMM GRANULOCYTES NFR BLD AUTO: 0.2 %
LYMPHOCYTES # BLD AUTO: 1.55 K/UL
LYMPHOCYTES NFR BLD AUTO: 27.6 %
MAN DIFF?: NORMAL
MCHC RBC-ENTMCNC: 29.5 PG
MCHC RBC-ENTMCNC: 31.1 GM/DL
MCV RBC AUTO: 94.9 FL
MONOCYTES # BLD AUTO: 0.47 K/UL
MONOCYTES NFR BLD AUTO: 8.4 %
NEUTROPHILS # BLD AUTO: 3.25 K/UL
NEUTROPHILS NFR BLD AUTO: 57.8 %
PLATELET # BLD AUTO: 245 K/UL
POTASSIUM SERPL-SCNC: 6.8 MMOL/L
PROT SERPL-MCNC: 8.4 G/DL
RBC # BLD: 5.32 M/UL
RBC # FLD: 13.6 %
SODIUM SERPL-SCNC: 136 MMOL/L
WBC # FLD AUTO: 5.62 K/UL

## 2021-10-28 LAB
ANA PAT FLD IF-IMP: ABNORMAL
ANA SER IF-ACNC: ABNORMAL

## 2021-11-03 LAB — POTASSIUM SERPL-SCNC: 4.2 MMOL/L

## 2021-11-16 ENCOUNTER — APPOINTMENT (OUTPATIENT)
Dept: INTERNAL MEDICINE | Facility: CLINIC | Age: 66
End: 2021-11-16
Payer: COMMERCIAL

## 2021-11-16 VITALS
WEIGHT: 149 LBS | SYSTOLIC BLOOD PRESSURE: 131 MMHG | DIASTOLIC BLOOD PRESSURE: 72 MMHG | OXYGEN SATURATION: 99 % | TEMPERATURE: 97.6 F | HEART RATE: 77 BPM | HEIGHT: 69 IN | BODY MASS INDEX: 22.07 KG/M2

## 2021-11-16 VITALS — DIASTOLIC BLOOD PRESSURE: 84 MMHG | SYSTOLIC BLOOD PRESSURE: 138 MMHG

## 2021-11-16 DIAGNOSIS — Z87.898 PERSONAL HISTORY OF OTHER SPECIFIED CONDITIONS: ICD-10-CM

## 2021-11-16 DIAGNOSIS — M25.551 PAIN IN RIGHT HIP: ICD-10-CM

## 2021-11-16 DIAGNOSIS — M25.50 PAIN IN UNSPECIFIED JOINT: ICD-10-CM

## 2021-11-16 DIAGNOSIS — L71.0 PERIORAL DERMATITIS: ICD-10-CM

## 2021-11-16 DIAGNOSIS — M65.30 TRIGGER FINGER, UNSPECIFIED FINGER: ICD-10-CM

## 2021-11-16 DIAGNOSIS — R07.81 PLEURODYNIA: ICD-10-CM

## 2021-11-16 DIAGNOSIS — R91.8 OTHER NONSPECIFIC ABNORMAL FINDING OF LUNG FIELD: ICD-10-CM

## 2021-11-16 DIAGNOSIS — R21 RASH AND OTHER NONSPECIFIC SKIN ERUPTION: ICD-10-CM

## 2021-11-16 DIAGNOSIS — R79.9 ABNORMAL FINDING OF BLOOD CHEMISTRY, UNSPECIFIED: ICD-10-CM

## 2021-11-16 DIAGNOSIS — Z01.818 ENCOUNTER FOR OTHER PREPROCEDURAL EXAMINATION: ICD-10-CM

## 2021-11-16 DIAGNOSIS — R20.0 ANESTHESIA OF SKIN: ICD-10-CM

## 2021-11-16 PROCEDURE — 99396 PREV VISIT EST AGE 40-64: CPT | Mod: 25

## 2021-11-16 PROCEDURE — 36415 COLL VENOUS BLD VENIPUNCTURE: CPT

## 2021-11-16 NOTE — PHYSICAL EXAM
[Normal Sclera/Conjunctiva] : normal sclera/conjunctiva [Normal Outer Ear/Nose] : the outer ears and nose were normal in appearance [No JVD] : no jugular venous distention [No Lymphadenopathy] : no lymphadenopathy [Supple] : supple [Normal Appearance] : normal in appearance [No Masses] : no palpable masses [No Nipple Discharge] : no nipple discharge [No Axillary Lymphadenopathy] : no axillary lymphadenopathy [Normal] : affect was normal and insight and judgment were intact [de-identified] : Right sided thyroid nodule

## 2021-11-16 NOTE — ASSESSMENT
[FreeTextEntry1] : Patient is a 66-year-old female with history of former heroin abusive, former alcoholic, smoker, adenocarcinoma lung status post right upper lobectomy, hypothyroidism, positive LANEY, prediabetes, hypertension, borderline hypercholesterolemia, thyroid nodule, multiple joint pain and history of hepatitis C status post treatment who presents for comprehensive annual physical examination persistent right rib pain\par \par 1. Right upper thoracic rib pain\par most likely secondary from surgery\par avoid heavy lifting\par Lidoderm patch and Aleve as needed\par \par 2. Adenocarcinoma lung\par status post lumpectomy\par continues to smoke refer to smoke enters discussed options\par \par 3. Positive LANEY multiple joint pain\par believe as needed\par refer to rheumatology for workup\par \par 4 thyroid nodule\par stage III if becomes greater than 1.5 cm will biopsy\par repeat sonogram of thyroid in February 2022\par \par 5. Hypothyroidism\par continue levothyroxine check TFTs next visit\par \par 6 hypertension\par continue hydrochlorothiazide\par borderline control will observe for now\par \par 7 prediabetes\par  on diet observe\par \par 8 health maintenance\par patient received flu and COVID vaccine consider booster\par referral for mammogram\par refer for colonoscopy\par consider bone density\par would consider pneumococcal and shingles vaccine\par follow-up in 3 months

## 2021-11-16 NOTE — HEALTH RISK ASSESSMENT
[Good] : ~his/her~  mood as  good [] : Yes [20 or more] : 20 or more [No] : No [Never (0 pts)] : Never (0 points) [No falls in past year] : Patient reported no falls in the past year [0] : 2) Feeling down, depressed, or hopeless: Not at all (0) [PHQ-2 Negative - No further assessment needed] : PHQ-2 Negative - No further assessment needed [Audit-CScore] : 0 [de-identified] : Walks [de-identified] : Fairly healthy [[id number]] : 0

## 2021-11-16 NOTE — REVIEW OF SYSTEMS
[Chest Pain] : chest pain [Negative] : Heme/Lymph [Palpitations] : no palpitations [Leg Claudication] : no leg claudication [Orthopnea] : no orthopnea [Paroxysmal Nocturnal Dyspnea] : no paroxysmal nocturnal dyspnea [FreeTextEntry5] : Chest wall pain

## 2021-11-16 NOTE — HISTORY OF PRESENT ILLNESS
[FreeTextEntry1] : Comprehensive annual physical examination follow-up for right sided chest wall pain [de-identified] : The patient is a 66-year-old female with long-standing smoking continues to smoke quarter pack per day former alcoholic, former heroin abuse and, diagnosed with adenocarcinoma of the lung status post right upper lobe lobectomy, hypertension, prediabetes, hypothyroidism, stage III thyroid nodule less than 1 cm, hep C status post Haroni, hypertension, vitamin D insufficiency emphysema/COPD, history of fibroids, who presents for comprehensive annual physical examination. Patient feels well however complains of intermittent right upper quadrant pain improved had used Aleve and Lidoderm patch. Also continues to smoke quarter pack per day denies chest pain, shortness of breath distant exertion palpitations

## 2021-11-16 NOTE — PAST MEDICAL HISTORY
[Postmenopausal] : postmenopausal [Menarche Age ____] : age at menarche was [unfilled] [Menopause Age____] : age at menopause was [unfilled] [Total Preg ___] : G[unfilled] [Full Term ___] : Full Term: [unfilled] [Abortions ___] : Abortions:[unfilled] [AB Induced ___] : elective abortions: [unfilled]

## 2021-12-03 ENCOUNTER — LABORATORY RESULT (OUTPATIENT)
Age: 66
End: 2021-12-03

## 2021-12-03 ENCOUNTER — APPOINTMENT (OUTPATIENT)
Dept: RHEUMATOLOGY | Facility: CLINIC | Age: 66
End: 2021-12-03
Payer: COMMERCIAL

## 2021-12-03 VITALS
OXYGEN SATURATION: 98 % | HEIGHT: 69 IN | WEIGHT: 144 LBS | SYSTOLIC BLOOD PRESSURE: 140 MMHG | RESPIRATION RATE: 17 BRPM | BODY MASS INDEX: 21.33 KG/M2 | HEART RATE: 79 BPM | DIASTOLIC BLOOD PRESSURE: 83 MMHG | TEMPERATURE: 97.7 F

## 2021-12-03 PROCEDURE — 99203 OFFICE O/P NEW LOW 30 MIN: CPT

## 2021-12-20 ENCOUNTER — TRANSCRIPTION ENCOUNTER (OUTPATIENT)
Age: 66
End: 2021-12-20

## 2021-12-20 LAB
25(OH)D3 SERPL-MCNC: 73.1 NG/ML
ALBUMIN SERPL ELPH-MCNC: 4.7 G/DL
ALP BLD-CCNC: 74 U/L
ALT SERPL-CCNC: 10 U/L
ANA PAT FLD IF-IMP: ABNORMAL
ANA SER IF-ACNC: ABNORMAL
ANION GAP SERPL CALC-SCNC: 15 MMOL/L
APPEARANCE: CLEAR
APTT BLD: 30.6 SEC
AST SERPL-CCNC: 14 U/L
B2 GLYCOPROT1 AB SER QL: POSITIVE
BACTERIA: NEGATIVE
BASOPHILS # BLD AUTO: 0.05 K/UL
BASOPHILS NFR BLD AUTO: 0.7 %
BILIRUB SERPL-MCNC: 0.3 MG/DL
BILIRUBIN URINE: NEGATIVE
BLOOD URINE: NEGATIVE
BUN SERPL-MCNC: 13 MG/DL
CALCIUM SERPL-MCNC: 10.5 MG/DL
CARDIOLIPIN AB SER IA-ACNC: NEGATIVE
CCP AB SER IA-ACNC: <8 UNITS
CENTROMERE IGG SER-ACNC: <0.2 CD:130001892
CHLORIDE SERPL-SCNC: 102 MMOL/L
CO2 SERPL-SCNC: 22 MMOL/L
COLOR: YELLOW
CREAT SERPL-MCNC: 0.69 MG/DL
CREAT SPEC-SCNC: 199 MG/DL
CREAT/PROT UR: 0.1 RATIO
CRP SERPL-MCNC: <3 MG/L
DSDNA AB SER-ACNC: <12 IU/ML
ENA RNP AB SER IA-ACNC: <0.2 AL
ENA SCL70 IGG SER IA-ACNC: <0.2 AL
ENA SM AB SER IA-ACNC: <0.2 AL
ENA SS-A AB SER IA-ACNC: <0.2 AL
ENA SS-B AB SER IA-ACNC: <0.2 AL
EOSINOPHIL # BLD AUTO: 0.3 K/UL
EOSINOPHIL NFR BLD AUTO: 4.4 %
ERYTHROCYTE [SEDIMENTATION RATE] IN BLOOD BY WESTERGREN METHOD: 53 MM/HR
GLUCOSE QUALITATIVE U: NEGATIVE
HCT VFR BLD CALC: 47.9 %
HGB BLD-MCNC: 15.2 G/DL
HYALINE CASTS: 2 /LPF
IMM GRANULOCYTES NFR BLD AUTO: 0.1 %
KETONES URINE: NEGATIVE
LEUKOCYTE ESTERASE URINE: NEGATIVE
LYMPHOCYTES # BLD AUTO: 2.01 K/UL
LYMPHOCYTES NFR BLD AUTO: 29.3 %
MAN DIFF?: NORMAL
MCHC RBC-ENTMCNC: 29.3 PG
MCHC RBC-ENTMCNC: 31.7 GM/DL
MCV RBC AUTO: 92.3 FL
MICROSCOPIC-UA: NORMAL
MONOCYTES # BLD AUTO: 0.44 K/UL
MONOCYTES NFR BLD AUTO: 6.4 %
NEUTROPHILS # BLD AUTO: 4.04 K/UL
NEUTROPHILS NFR BLD AUTO: 59.1 %
NITRITE URINE: NEGATIVE
PH URINE: 7.5
PLATELET # BLD AUTO: 255 K/UL
POTASSIUM SERPL-SCNC: 4.6 MMOL/L
PROT SERPL-MCNC: 8.5 G/DL
PROT UR-MCNC: 16 MG/DL
PROTEIN URINE: ABNORMAL
RBC # BLD: 5.19 M/UL
RBC # FLD: 13.3 %
RED BLOOD CELLS URINE: 8 /HPF
RF+CCP IGG SER-IMP: NEGATIVE
RHEUMATOID FACT SER QL: <10 IU/ML
RNA POLYMERASE III IGG: 11 UNITS
SODIUM SERPL-SCNC: 139 MMOL/L
SPECIFIC GRAVITY URINE: 1.03
SQUAMOUS EPITHELIAL CELLS: 1 /HPF
THYROGLOB AB SERPL-ACNC: ABNORMAL IU/ML
THYROPEROXIDASE AB SERPL IA-ACNC: ABNORMAL IU/ML
TSH SERPL-ACNC: 1.07 UIU/ML
UROBILINOGEN URINE: ABNORMAL
WBC # FLD AUTO: 6.85 K/UL
WHITE BLOOD CELLS URINE: 0 /HPF

## 2022-01-20 DIAGNOSIS — J11.1 INFLUENZA DUE TO UNIDENTIFIED INFLUENZA VIRUS WITH OTHER RESPIRATORY MANIFESTATIONS: ICD-10-CM

## 2022-01-31 ENCOUNTER — TRANSCRIPTION ENCOUNTER (OUTPATIENT)
Age: 67
End: 2022-01-31

## 2022-02-15 ENCOUNTER — RX RENEWAL (OUTPATIENT)
Age: 67
End: 2022-02-15

## 2022-02-22 ENCOUNTER — RX RENEWAL (OUTPATIENT)
Age: 67
End: 2022-02-22

## 2022-03-24 ENCOUNTER — APPOINTMENT (OUTPATIENT)
Dept: INTERNAL MEDICINE | Facility: CLINIC | Age: 67
End: 2022-03-24
Payer: COMMERCIAL

## 2022-03-24 VITALS
DIASTOLIC BLOOD PRESSURE: 84 MMHG | SYSTOLIC BLOOD PRESSURE: 136 MMHG | OXYGEN SATURATION: 98 % | BODY MASS INDEX: 20.14 KG/M2 | TEMPERATURE: 97.6 F | HEART RATE: 64 BPM | WEIGHT: 136 LBS | HEIGHT: 69 IN

## 2022-03-24 VITALS — SYSTOLIC BLOOD PRESSURE: 120 MMHG | DIASTOLIC BLOOD PRESSURE: 70 MMHG

## 2022-03-24 DIAGNOSIS — G89.18 OTHER ACUTE POSTPROCEDURAL PAIN: ICD-10-CM

## 2022-03-24 DIAGNOSIS — Z86.19 PERSONAL HISTORY OF OTHER INFECTIOUS AND PARASITIC DISEASES: ICD-10-CM

## 2022-03-24 PROCEDURE — 99214 OFFICE O/P EST MOD 30 MIN: CPT

## 2022-03-24 NOTE — HISTORY OF PRESENT ILLNESS
[FreeTextEntry1] : Follow-up for lung cancer, hypothyroidism, hypertension, joint pain and positive LANEY [de-identified] : Patient is a 67-year-old female with history of smoking currently 2 to 3 cigarettes a day, lung cancer status post resection, hypothyroidism hypertension, prediabetes, fibroids, hypothyroidism, thyroid nodule emphysema, with chronic joint pain and positive LANEY who presents for follow-up patient feels well continues to smoke 2 to 3 cigarettes a day denies chest pain, shortness of breath distant exertion palpitations but complains of persistent joint pain

## 2022-03-24 NOTE — REVIEW OF SYSTEMS
[Joint Stiffness] : joint stiffness [Negative] : Psychiatric [Joint Swelling] : no joint swelling [Muscle Weakness] : no muscle weakness [Muscle Pain] : no muscle pain [Back Pain] : no back pain

## 2022-03-24 NOTE — ASSESSMENT
[FreeTextEntry1] : Patient is a 67-year-old female with history of smoking current smoker lung cancer status post resection, prediabetes hypertension hypothyroidism, thyroid nodule, positive LANEY, fibroids, who presents for follow-up\par \par \par 1.Lung cancer\par follow-up with oncology\par continues to smoke  on cessation\par will get repeat CT one year anniversary of surgery\par \par 2. Smoking\par  on cessation patient reluctant to start any medication\par \par 3. emphysema\par no shortness of breath refuses inhalers\par \par 4. Hypertension\par well-controlled on hydrochlorothiazide\par \par 5 hypothyroidism\par continue levothyroxine hundred micrograms check TFTs\par \par 6 history of thyroid nodules\par repeat mammogram\par \par 7 history of positive LANEY\par repeat LANEY follow-up with rheumatology\par \par 8 health maintenance\par mammogram referral given bone density given\par colonoscopy referral given\par follow-up in three months

## 2022-04-06 LAB
ANA PAT FLD IF-IMP: ABNORMAL
ANA SER IF-ACNC: ABNORMAL
ANION GAP SERPL CALC-SCNC: 9 MMOL/L
BUN SERPL-MCNC: 14 MG/DL
CALCIUM SERPL-MCNC: 10.2 MG/DL
CHLORIDE SERPL-SCNC: 104 MMOL/L
CO2 SERPL-SCNC: 28 MMOL/L
CREAT SERPL-MCNC: 0.72 MG/DL
EGFR: 93 ML/MIN/1.73M2
ESTIMATED AVERAGE GLUCOSE: 117 MG/DL
GLUCOSE SERPL-MCNC: 86 MG/DL
HBA1C MFR BLD HPLC: 5.7 %
POTASSIUM SERPL-SCNC: 4.6 MMOL/L
SODIUM SERPL-SCNC: 141 MMOL/L
TSH SERPL-ACNC: 1.46 UIU/ML

## 2022-04-11 PROBLEM — Z11.59 SCREENING FOR VIRAL DISEASE: Status: RESOLVED | Noted: 2020-09-24 | Resolved: 2021-04-08

## 2022-04-15 ENCOUNTER — APPOINTMENT (OUTPATIENT)
Dept: ULTRASOUND IMAGING | Facility: IMAGING CENTER | Age: 67
End: 2022-04-15
Payer: COMMERCIAL

## 2022-04-15 ENCOUNTER — APPOINTMENT (OUTPATIENT)
Dept: RADIOLOGY | Facility: IMAGING CENTER | Age: 67
End: 2022-04-15
Payer: COMMERCIAL

## 2022-04-15 ENCOUNTER — OUTPATIENT (OUTPATIENT)
Dept: OUTPATIENT SERVICES | Facility: HOSPITAL | Age: 67
LOS: 1 days | End: 2022-04-15
Payer: COMMERCIAL

## 2022-04-15 ENCOUNTER — RESULT REVIEW (OUTPATIENT)
Age: 67
End: 2022-04-15

## 2022-04-15 ENCOUNTER — APPOINTMENT (OUTPATIENT)
Dept: MAMMOGRAPHY | Facility: IMAGING CENTER | Age: 67
End: 2022-04-15
Payer: COMMERCIAL

## 2022-04-15 DIAGNOSIS — Z98.890 OTHER SPECIFIED POSTPROCEDURAL STATES: Chronic | ICD-10-CM

## 2022-04-15 DIAGNOSIS — Z00.8 ENCOUNTER FOR OTHER GENERAL EXAMINATION: ICD-10-CM

## 2022-04-15 PROCEDURE — 77063 BREAST TOMOSYNTHESIS BI: CPT | Mod: 26

## 2022-04-15 PROCEDURE — 77067 SCR MAMMO BI INCL CAD: CPT

## 2022-04-15 PROCEDURE — 77067 SCR MAMMO BI INCL CAD: CPT | Mod: 26

## 2022-04-15 PROCEDURE — 77063 BREAST TOMOSYNTHESIS BI: CPT

## 2022-04-15 PROCEDURE — 77085 DXA BONE DENSITY AXL VRT FX: CPT | Mod: 26

## 2022-04-15 PROCEDURE — 76536 US EXAM OF HEAD AND NECK: CPT

## 2022-04-15 PROCEDURE — 76536 US EXAM OF HEAD AND NECK: CPT | Mod: 26

## 2022-04-15 PROCEDURE — 77085 DXA BONE DENSITY AXL VRT FX: CPT

## 2022-05-19 ENCOUNTER — RX RENEWAL (OUTPATIENT)
Age: 67
End: 2022-05-19

## 2022-05-20 ENCOUNTER — NON-APPOINTMENT (OUTPATIENT)
Age: 67
End: 2022-05-20

## 2022-06-03 ENCOUNTER — APPOINTMENT (OUTPATIENT)
Dept: GASTROENTEROLOGY | Facility: CLINIC | Age: 67
End: 2022-06-03
Payer: COMMERCIAL

## 2022-06-03 VITALS
HEART RATE: 80 BPM | HEIGHT: 69 IN | OXYGEN SATURATION: 97 % | DIASTOLIC BLOOD PRESSURE: 75 MMHG | BODY MASS INDEX: 20.41 KG/M2 | WEIGHT: 137.8 LBS | SYSTOLIC BLOOD PRESSURE: 120 MMHG

## 2022-06-03 DIAGNOSIS — Z12.11 ENCOUNTER FOR SCREENING FOR MALIGNANT NEOPLASM OF COLON: ICD-10-CM

## 2022-06-03 PROCEDURE — 99204 OFFICE O/P NEW MOD 45 MIN: CPT

## 2022-06-03 NOTE — ASSESSMENT
[FreeTextEntry1] : Screening, average risk for colon cancer and polyps\par \par Plan\par Indications risks benefits and alternatives to colonoscopy reviewed\par Patient agreeable to examination\par \par Patient referred by Dr. ROSA He

## 2022-06-03 NOTE — PHYSICAL EXAM
[General Appearance - Alert] : alert [General Appearance - In No Acute Distress] : in no acute distress [Sclera] : the sclera and conjunctiva were normal [PERRL With Normal Accommodation] : pupils were equal in size, round, and reactive to light [Extraocular Movements] : extraocular movements were intact [Outer Ear] : the ears and nose were normal in appearance [Oropharynx] : the oropharynx was normal [Neck Appearance] : the appearance of the neck was normal [Neck Cervical Mass (___cm)] : no neck mass was observed [Jugular Venous Distention Increased] : there was no jugular-venous distention [Thyroid Diffuse Enlargement] : the thyroid was not enlarged [Thyroid Nodule] : there were no palpable thyroid nodules [Auscultation Breath Sounds / Voice Sounds] : lungs were clear to auscultation bilaterally [Heart Rate And Rhythm] : heart rate was normal and rhythm regular [Heart Sounds] : normal S1 and S2 [Heart Sounds Gallop] : no gallops [Murmurs] : no murmurs [Heart Sounds Pericardial Friction Rub] : no pericardial rub [Edema] : there was no peripheral edema [Bowel Sounds] : normal bowel sounds [Abdomen Soft] : soft [Abdomen Tenderness] : non-tender [Abdomen Mass (___ Cm)] : no abdominal mass palpated [FreeTextEntry1] : Well-healed scar [Cervical Lymph Nodes Enlarged Posterior Bilaterally] : posterior cervical [Cervical Lymph Nodes Enlarged Anterior Bilaterally] : anterior cervical [Supraclavicular Lymph Nodes Enlarged Bilaterally] : supraclavicular [Axillary Lymph Nodes Enlarged Bilaterally] : axillary [Femoral Lymph Nodes Enlarged Bilaterally] : femoral [Inguinal Lymph Nodes Enlarged Bilaterally] : inguinal [No CVA Tenderness] : no ~M costovertebral angle tenderness [No Spinal Tenderness] : no spinal tenderness [Abnormal Walk] : normal gait [Nail Clubbing] : no clubbing  or cyanosis of the fingernails [Musculoskeletal - Swelling] : no joint swelling seen [Motor Tone] : muscle strength and tone were normal [Skin Color & Pigmentation] : normal skin color and pigmentation [Skin Turgor] : normal skin turgor [] : no rash [Oriented To Time, Place, And Person] : oriented to person, place, and time [Impaired Insight] : insight and judgment were intact [Affect] : the affect was normal

## 2022-06-03 NOTE — HISTORY OF PRESENT ILLNESS
[de-identified] : 67-year-old female presents for evaluation prior to screening colonoscopy\par Last examination 12 years ago without polypectomy\par No significant interval complaints\par No family history of colon cancer\par Denies constipation\par Denies reflux or difficulty swallowing\par \par Social history: Lives most of the time in [place], works at [employer] facility\par Still smoking despite history of lung cancer, resection

## 2022-06-30 ENCOUNTER — APPOINTMENT (OUTPATIENT)
Dept: INTERNAL MEDICINE | Facility: CLINIC | Age: 67
End: 2022-06-30

## 2022-06-30 VITALS
SYSTOLIC BLOOD PRESSURE: 146 MMHG | DIASTOLIC BLOOD PRESSURE: 53 MMHG | WEIGHT: 141 LBS | TEMPERATURE: 97.8 F | BODY MASS INDEX: 20.82 KG/M2 | HEART RATE: 87 BPM | OXYGEN SATURATION: 98 %

## 2022-06-30 VITALS — SYSTOLIC BLOOD PRESSURE: 128 MMHG | DIASTOLIC BLOOD PRESSURE: 80 MMHG

## 2022-06-30 PROCEDURE — 99214 OFFICE O/P EST MOD 30 MIN: CPT

## 2022-06-30 NOTE — PHYSICAL EXAM
[Normal Sclera/Conjunctiva] : normal sclera/conjunctiva [Normal Outer Ear/Nose] : the outer ears and nose were normal in appearance [No Respiratory Distress] : no respiratory distress  [No Accessory Muscle Use] : no accessory muscle use [Clear to Auscultation] : lungs were clear to auscultation bilaterally [Normal] : affect was normal and insight and judgment were intact [de-identified] : Decreased breath sounds  {

## 2022-06-30 NOTE — ASSESSMENT
[FreeTextEntry1] : Patient is a 67-year-old with history of former drug addict, alcohol abuse a with long history of smoking lung cancer status post resection in February, hypothyroidism, hypertension, prediabetes, elevated LANEY who presents for follow-up\par \par 1 adenocarcinoma long\par status post lung resection\par observe\par \par 2. Smoking\par continue smoke two packs per week\par  on cessation\par will continue lung cancer screening with low-dose CT\par \par 3 hypothyroidism\par continue levothyroxine hundred micrograms check TFTs next visit normal in March\par \par 4. Hypertension\par well-controlled on hydrochlorothiazide 25 mg\par \par 5 health maintenance\par colonoscopy pending for next week\par bone density showed mild osteopenia

## 2022-06-30 NOTE — HISTORY OF PRESENT ILLNESS
[FreeTextEntry1] : Follow-up for lung cancer and smoking [de-identified] : Patient is a 67-year-old female with a long history of smoking, adenocarcinoma status post resection, hypothyroidism, emphysema, prediabetes, thyroid nodule, vitamin D insufficiency, history of positive LANEY who presents for follow-up patient continues to smoke two packs per week denies chest pain, shortness of breath this exertion feels well.

## 2022-07-06 ENCOUNTER — NON-APPOINTMENT (OUTPATIENT)
Age: 67
End: 2022-07-06

## 2022-07-07 ENCOUNTER — APPOINTMENT (OUTPATIENT)
Dept: GASTROENTEROLOGY | Facility: HOSPITAL | Age: 67
End: 2022-07-07

## 2022-07-13 ENCOUNTER — NON-APPOINTMENT (OUTPATIENT)
Age: 67
End: 2022-07-13

## 2022-07-13 VITALS — WEIGHT: 141 LBS | BODY MASS INDEX: 20.88 KG/M2 | HEIGHT: 69 IN

## 2022-07-13 NOTE — PLAN
[Smoking Cessation Guidance Provided] : Smoking cessation guidance was provided to patient [Smoking Cessation] : smoking cessation [Age appropriate screenings] : Age appropriate screenings [Regular follow-up with healthcare provider] : regular follow-up with healthcare provider [FreeTextEntry1] : Plan:\par \par -Low Dose CT chest for lung cancer screening scheduled for 7/14/22\par \par -Patient to follow up with Dr. He to review results of LDCT\par \par -Encouraged smoking cessation\par \par \par Engaged in shared decision making with Ms. DIPIKA ROTH . Answered all questions. She verbalized understanding and agreement. She knows to call back with any questions or concerns\par

## 2022-07-13 NOTE — REASON FOR VISIT
[Home] : at home, [unfilled] , at the time of the visit. [Medical Office: ([facility])___] : at the medical office located in  [Verbal consent obtained from patient] : the patient, [unfilled] [Annual Follow-Up] : an annual follow-up visit [Review of Eligibility] : review of eligibility [Low-Dose CT Screening Discussion] : low-dose CT lung cancer screening discussion [Virtual Visit] : virtual visit

## 2022-07-13 NOTE — ASSESSMENT
[Discussed Risks and Advised to Quit Smoking] : Discussed risks and advised to quit smoking [Discussed Cessation Strategies] : cessation strategies were discussed [Not Ready] : Patient is not ready for cessation intervention [Contemplation] : Contemplation: The patient is considering quitting smoking

## 2022-07-13 NOTE — HISTORY OF PRESENT ILLNESS
[Current] : current smoker [>= 30 pack years] : >= 30 pack years [TextBox_13] : Referred by Dr. He\par \par Ms. DIPIKA ROTH is a 67 year old woman with a history of nicotine dependence\par \par She was seen in the office by Dr. He for review of eligibility for, as well as, discussion of Low-Dose CT lung cancer screening program. Over the telephone today we reviewed and confirmed that the patient meets screening eligibility criteria:\par \par -Age: 67 years old\par \par Smoking status:\par \par -Current smoker\par \par -Number of pack(s) per day: 1 ppd x 40 y, .2 ppd x 1y; 40 pack-year hx\par \par \par \par Ms. ROTH denies any signs or symptoms of lung cancer including new cough, change in cough, hemoptysis and unintentional weight loss.\par \par Ms. ROTH denies any personal history of lung cancer. No lung cancer in a 1st degree relative. Denies any history of lung disease. Denies any history of occupational exposures\par  [TextBox_6] : 1 [TextBox_8] : 40

## 2022-08-04 ENCOUNTER — APPOINTMENT (OUTPATIENT)
Dept: CT IMAGING | Facility: IMAGING CENTER | Age: 67
End: 2022-08-04

## 2022-08-04 ENCOUNTER — OUTPATIENT (OUTPATIENT)
Dept: OUTPATIENT SERVICES | Facility: HOSPITAL | Age: 67
LOS: 1 days | End: 2022-08-04
Payer: COMMERCIAL

## 2022-08-04 DIAGNOSIS — Z00.8 ENCOUNTER FOR OTHER GENERAL EXAMINATION: ICD-10-CM

## 2022-08-04 DIAGNOSIS — Z98.890 OTHER SPECIFIED POSTPROCEDURAL STATES: Chronic | ICD-10-CM

## 2022-08-04 DIAGNOSIS — C34.90 MALIGNANT NEOPLASM OF UNSPECIFIED PART OF UNSPECIFIED BRONCHUS OR LUNG: ICD-10-CM

## 2022-08-04 PROCEDURE — 71250 CT THORAX DX C-: CPT

## 2022-08-04 PROCEDURE — 71250 CT THORAX DX C-: CPT | Mod: 26

## 2022-08-08 ENCOUNTER — TRANSCRIPTION ENCOUNTER (OUTPATIENT)
Age: 67
End: 2022-08-08

## 2022-08-15 ENCOUNTER — RX RENEWAL (OUTPATIENT)
Age: 67
End: 2022-08-15

## 2022-09-16 ENCOUNTER — RESULT REVIEW (OUTPATIENT)
Age: 67
End: 2022-09-16

## 2022-09-16 ENCOUNTER — LABORATORY RESULT (OUTPATIENT)
Age: 67
End: 2022-09-16

## 2022-09-16 ENCOUNTER — OUTPATIENT (OUTPATIENT)
Dept: OUTPATIENT SERVICES | Facility: HOSPITAL | Age: 67
LOS: 1 days | End: 2022-09-16
Payer: MEDICARE

## 2022-09-16 ENCOUNTER — APPOINTMENT (OUTPATIENT)
Dept: INTERNAL MEDICINE | Facility: CLINIC | Age: 67
End: 2022-09-16

## 2022-09-16 VITALS
BODY MASS INDEX: 19.64 KG/M2 | TEMPERATURE: 97.3 F | HEART RATE: 74 BPM | WEIGHT: 132.6 LBS | HEIGHT: 69 IN | OXYGEN SATURATION: 98 % | SYSTOLIC BLOOD PRESSURE: 152 MMHG | DIASTOLIC BLOOD PRESSURE: 82 MMHG

## 2022-09-16 DIAGNOSIS — Z98.890 OTHER SPECIFIED POSTPROCEDURAL STATES: Chronic | ICD-10-CM

## 2022-09-16 PROCEDURE — 76700 US EXAM ABDOM COMPLETE: CPT | Mod: 26

## 2022-09-16 PROCEDURE — 99214 OFFICE O/P EST MOD 30 MIN: CPT

## 2022-09-16 PROCEDURE — 71100 X-RAY EXAM RIBS UNI 2 VIEWS: CPT | Mod: 26,RT

## 2022-09-16 PROCEDURE — 76700 US EXAM ABDOM COMPLETE: CPT

## 2022-09-16 PROCEDURE — 71100 X-RAY EXAM RIBS UNI 2 VIEWS: CPT

## 2022-09-16 NOTE — HISTORY OF PRESENT ILLNESS
[FreeTextEntry1] : Follow-up for weight loss, hypothyroidism, lung cancer, hypertension abnormal LANEY [de-identified] : The patient is a 67-year-old female with history of lung cancer status post right upper lobe lobectomy, continues to smoke, hypertension, hypothyroidism, elevated LANEY, prediabetes, cervical radiculopathy, history of chronic hep C status post treatment, thyroid nodules, vitamin D insufficiency who presents for follow-up patient complains of persistent weight loss weight down to 132 from 141 six weeks ago patient denies abdominal pain nausea vomiting constipation but notes some right rib pain lower

## 2022-09-16 NOTE — REVIEW OF SYSTEMS
[Chest Pain] : chest pain [Palpitations] : no palpitations [Leg Claudication] : no leg claudication [Lower Ext Edema] : no lower extremity edema [Orthopnea] : no orthopnea [Paroxysmal Nocturnal Dyspnea] : no paroxysmal nocturnal dyspnea [Negative] : Psychiatric [FreeTextEntry5] : Right sided rib pain

## 2022-09-16 NOTE — ASSESSMENT
[FreeTextEntry1] : Patient is a 67-year-old female with history of hypertension, former alcohol drug abuser, smoker, lung cancer, hypothyroidism, thyroid nodule, elevated LANEY, vitamin D insufficiency, right rib pain, who presents for follow-up of weight loss\par \par 1 weight loss\par etiology unclear\par mammogram negative\par colonoscopy scheduled for next month\par repeat CT from August doing October some new nodules\par will check labs urine sonogram of abdomen\par supplement with ensure abuse\par follow-up in one month\par \par 2 hypothyroidism\par continue levothyroxine hundred micrograms check TFTs\par \par 3 hypertension\par reasonably well-controlled on hydrochlorothiazide 25 mg once a day\par \par 4 elevated LANEY history of joint pain\par refer to rheumatology\par \par 5 history of lung cancer\par follow-up with surgeon and repeat CAT scan next month\par \par Number six health maintenance\par referral to GYN flu shot next month

## 2022-10-07 ENCOUNTER — NON-APPOINTMENT (OUTPATIENT)
Age: 67
End: 2022-10-07

## 2022-10-10 ENCOUNTER — APPOINTMENT (OUTPATIENT)
Dept: GASTROENTEROLOGY | Facility: HOSPITAL | Age: 67
End: 2022-10-10

## 2022-10-10 ENCOUNTER — RESULT REVIEW (OUTPATIENT)
Age: 67
End: 2022-10-10

## 2022-10-10 ENCOUNTER — OUTPATIENT (OUTPATIENT)
Dept: OUTPATIENT SERVICES | Facility: HOSPITAL | Age: 67
LOS: 1 days | End: 2022-10-10
Payer: COMMERCIAL

## 2022-10-10 ENCOUNTER — TRANSCRIPTION ENCOUNTER (OUTPATIENT)
Age: 67
End: 2022-10-10

## 2022-10-10 VITALS
OXYGEN SATURATION: 100 % | HEART RATE: 64 BPM | DIASTOLIC BLOOD PRESSURE: 70 MMHG | RESPIRATION RATE: 20 BRPM | SYSTOLIC BLOOD PRESSURE: 145 MMHG

## 2022-10-10 VITALS
SYSTOLIC BLOOD PRESSURE: 121 MMHG | HEART RATE: 76 BPM | RESPIRATION RATE: 19 BRPM | TEMPERATURE: 97 F | OXYGEN SATURATION: 100 % | DIASTOLIC BLOOD PRESSURE: 83 MMHG

## 2022-10-10 DIAGNOSIS — Z12.11 ENCOUNTER FOR SCREENING FOR MALIGNANT NEOPLASM OF COLON: ICD-10-CM

## 2022-10-10 DIAGNOSIS — Z98.890 OTHER SPECIFIED POSTPROCEDURAL STATES: Chronic | ICD-10-CM

## 2022-10-10 PROCEDURE — 45385 COLONOSCOPY W/LESION REMOVAL: CPT

## 2022-10-10 PROCEDURE — 45385 COLONOSCOPY W/LESION REMOVAL: CPT | Mod: PT

## 2022-10-10 PROCEDURE — 88305 TISSUE EXAM BY PATHOLOGIST: CPT | Mod: 26

## 2022-10-10 PROCEDURE — 88305 TISSUE EXAM BY PATHOLOGIST: CPT

## 2022-10-10 DEVICE — NET RETRV ROT ROTH 2.5MMX230CM: Type: IMPLANTABLE DEVICE | Status: FUNCTIONAL

## 2022-10-10 RX ORDER — IBUPROFEN 200 MG
2 TABLET ORAL
Qty: 0 | Refills: 0 | DISCHARGE

## 2022-10-10 RX ORDER — LEVOTHYROXINE SODIUM 125 MCG
1 TABLET ORAL
Qty: 0 | Refills: 0 | DISCHARGE

## 2022-10-10 RX ORDER — ACETAMINOPHEN 500 MG
2 TABLET ORAL
Qty: 0 | Refills: 0 | DISCHARGE

## 2022-10-10 RX ORDER — LORATADINE 10 MG/1
1 TABLET ORAL
Qty: 0 | Refills: 0 | DISCHARGE

## 2022-10-10 RX ORDER — GARLIC 1000 MG
3000 CAPSULE ORAL
Qty: 0 | Refills: 0 | DISCHARGE

## 2022-10-10 RX ORDER — SODIUM CHLORIDE 9 MG/ML
500 INJECTION INTRAMUSCULAR; INTRAVENOUS; SUBCUTANEOUS
Refills: 0 | Status: DISCONTINUED | OUTPATIENT
Start: 2022-10-10 | End: 2022-10-24

## 2022-10-10 NOTE — ASU PATIENT PROFILE, ADULT - FALL HARM RISK - FALL HARM RISK
After Visit Summary   2018    Kallie Olsen    MRN: [id number]           Patient Information     Date Of Birth          2000        Visit Information        Provider Department      2018 7:00 AM Salty Maher DO [facility]        Today's Diagnoses     Other closed fracture of distal end of right radius with routine healing, subsequent encounter    -  1      Care Instructions    Thanks for coming today.  [facility] Clinic  [address] [place] [id number]    To schedule future appointments in Ortho Clinic, you may call [phone].    To schedule ordered imaging by your provider:   Call Central Imaging [phone]    To schedule an injection ordered by your provider:  Call Central Imaging Injection scheduling line: [phone]  Tang[employer]hart available online at:  [email or web address]    Please call if any further questions or concerns ([phone]).  Clinic hours 8 am to 5 pm.    Return to clinic (call) if symptoms worsen or fail to improve.            Follow-ups after your visit        Your next 10 appointments already scheduled     2018  7:00 AM CDT   Return Visit with Salty Maher DO   [facility] ([facility])    [address] [place] [id number] [phone]              Who to contact     If you have questions or need follow up information about today's clinic visit or your schedule please contact [facility] directly at [phone].  Normal or non-critical lab and imaging results will be communicated to you by MyChart, letter or phone within 4 business days after the clinic has received the results. If you do not hear from us within 7 days, please contact the clinic through Tangoehart or phone. If you have a critical or abnormal lab result, we will notify you by phone as soon as possible.  Submit refill requests through Tangoehart or call your  pharmacy and they will forward the refill request to us. Please allow 3 business days for your refill to be completed.          Additional Information About Your Visit        [email or web address] Information     Bonush gives you secure access to your electronic health record. If you see a primary care provider, you can also send messages to your care team and make appointments. If you have questions, please call your primary care clinic.  If you do not have a primary care provider, please call [phone] and they will assist you.      Bonush is an electronic gateway that provides easy, online access to your medical records. With Bonush, you can request a clinic appointment, read your test results, renew a prescription or communicate with your care team.     To access your existing account, please contact your [facility] or call [phone] for assistance.        Care EveryWhere ID     This is your Care EveryWhere ID. This could be used by other organizations to access your [facility] medical records  Opted out of Care Everywhere exchange        Your Vitals Were     Pulse Pulse Oximetry                100 97%           Blood Pressure from Last 3 Encounters:   04/12/18 122/75   04/05/18 115/55   04/04/18 124/72    Weight from Last 3 Encounters:   04/04/18 68.4 kg (150 lb 12.8 oz) (88 %)*   04/28/17 68.5 kg (151 lb) (90 %)*   04/20/17 67.1 kg (148 lb) (89 %)*     * Growth percentiles are based on [facility] 2-20 Years data.              Today, you had the following     No orders found for display       Primary Care Provider Office Phone # Fax #    Earl Solo [phone]       [id number]  [facility] [address]        Equal Access to Services     [facility] HospitalNATALIE : Hadii jensen chen Soroosevelt, waaxda luqadaha, qaybta kaalmada praneeth rodriguez. So [facility] [phone].    ATENCIÓN: Si habla español, tiene a rothman disposición servicios gratuitos de  asistencia lingüística. Manisha al [phone].    We comply with applicable federal civil rights laws and [place] laws. We do not discriminate on the basis of race, color, national origin, age, disability, sex, sexual orientation, or gender identity.            Thank you!     Thank you for choosing [facility]  for your care. Our goal is always to provide you with excellent care. Hearing back from our patients is one way we can continue to improve our services. Please take a few minutes to complete the written survey that you may receive in the mail after your visit with us. Thank you!             Your Updated Medication List - Protect others around you: Learn how to safely use, store and throw away your medicines at www.disposemymeds.org.          This list is accurate as of 4/12/18  9:33 AM.  Always use your most recent med list.                   Brand Name Dispense Instructions for use Diagnosis    ALEVE PO           order for DME     1 Device    Equipment being ordered: R wrist splint    Wrist injury, right, initial encounter       TYLENOL PO              No indicators present

## 2022-10-10 NOTE — PRE PROCEDURE NOTE - PRE PROCEDURE EVALUATION
Attending Physician:        Velasquez Sauceda MD                    Procedure:    Indication for Procedure: screening  ________________________________________________________  PAST MEDICAL & SURGICAL HISTORY:  HTN (hypertension)      Hypothyroid      HTN (hypertension)      H/O myomectomy        ALLERGIES:  No Known Allergies    HOME MEDICATIONS:  Advil 200 mg oral tablet: 2 tab(s) orally every 6 hours, As Needed or can take Aleve if you prefer  Claritin 10 mg oral tablet: 1 tab(s) orally once a day  Garlic oral capsule: 3000 milligram(s) orally once a day  hydroCHLOROthiazide 25 mg oral tablet: 1 tab(s) orally once a day  levothyroxine 100 mcg (0.1 mg) oral tablet: 1 tab(s) orally once a day  Probiotic Formula oral capsule: 1 cap(s) orally once a day  Tylenol 325 mg oral tablet: 2 tab(s) orally every 6 hours, As Needed  Vitamin D3 1000 intl units (25 mcg) oral tablet: orally once a day    AICD/PPM: [ ] yes   [x ] no    PERTINENT LAB DATA:                      PHYSICAL EXAMINATION:    T(C): --  HR: --  BP: --  RR: --  SpO2: --    Constitutional: NAD  HEENT: PERRLA, EOMI,    Neck:  No JVD  Respiratory: CTAB/L  Cardiovascular: S1 and S2  Gastrointestinal: BS+, soft, NT/ND  Extremities: No peripheral edema  Neurological: A/O x 3, no focal deficits  Psychiatric: Normal mood, normal affect  Skin: No rashes    ASA Class: I [ ]  II [x ]  III [ ]  IV [ ]    COMMENTS:    The patient is a suitable candidate for the planned procedure unless box checked [ ]  No, explain:

## 2022-10-10 NOTE — ASU DISCHARGE PLAN (ADULT/PEDIATRIC) - NSDISCH_GASTRIC_ENDO_ALL_CORE_FT
In addition to the advanced practice provider, I personally saw Naima Teixeira and performed a substantive portion of the visit including all aspects of the medical decision making. Briefly, this is a 60 y.o. female here for the ER for evaluation of subjective dizziness, lightheadedness, possible exacerbation with movement of her head, prior history of coronary artery disease and stent placement. Positive dyspnea of lightheadedness and vertigo. Denies acute precordial chest pain but complained of palpitations intermittent. No nystagmus no drift, no pulse deficit. .        EKG  EKG was reviewed by emergency department physician in the absence of a cardiologist    Narrow complex sinus rhythm, rate , normal axis, normal DE and QRS intervals, normal Qtc, no ST elevations or depressions, normal t-wave morphology, impression NSR, no STEMI      Screenings  NIH Stroke Scale  Interval: Baseline  Level of Consciousness (1a): Alert  LOC Questions (1b): Answers both correctly  LOC Commands (1c): Performs both tasks correctly  Best Gaze (2): Normal  Visual (3): No visual loss  Facial Palsy (4): Normal symmetrical movement  Motor Arm, Left (5a): No drift  Motor Arm, Right (5b): No drift  Motor Leg, Left (6a): No drift  Motor Leg, Right (6b): No drift  Limb Ataxia (7): Absent  Sensory (8): Normal  Best Language (9): No aphasia  Dysarthria (10): Normal  Extinction and Inattention (11): No abnormality  Total: 0Glasgow Coma Scale  Eye Opening: Spontaneous  Best Verbal Response: Oriented  Best Motor Response: Obeys commands  Creede Coma Scale Score: 15        MDM  Patient presents to the ER for evaluation of underlying dizziness, with no evidence of dissection large vessel occlusion no evidence of stroke. Patient will be admitted for dysrhythmia monitoring cardiovascular rule out. Case discussed with admitting medical staff. Patient Referrals:  No follow-up provider specified.     Discharge Medications:  Discharge Medication List as of 6/23/2022 12:29 PM          FINAL IMPRESSION  1. Dizziness    2. Palpitations        Blood pressure (!) 143/79, pulse 58, temperature 97.7 °F (36.5 °C), temperature source Oral, resp. rate 16, height 5' 4\" (1.626 m), weight 184 lb 4.8 oz (83.6 kg), SpO2 95 %, not currently breastfeeding.      For further details of [facility] emergency department encounter, please see documentation by advanced practice provider,     Doree Mohs, MD (electronically signed)  Attending Emergency Physician     Chen Kc MD  09/07/29 1117 You might feel 'gassy' or "crampy'' for a few hours. This is because air was put into the stomach during the procedure. However, if you have continued abdominal (stomach) pain or swelling, contact your doctor right away.

## 2022-10-10 NOTE — ASU DISCHARGE PLAN (ADULT/PEDIATRIC) - NS MD DC FALL RISK RISK
For information on Fall & Injury Prevention, visit: [email or web address] OR  [email or web address] OR  https://www.cdc.gov/steadi/patient.html

## 2022-10-10 NOTE — ASU PATIENT PROFILE, ADULT - FALL HARM RISK - UNIVERSAL INTERVENTIONS
Bed in lowest position, wheels locked, appropriate side rails in place/Call bell, personal items and telephone in reach/Instruct patient to call for assistance before getting out of bed or chair/Non-slip footwear when patient is out of bed/[place] to call system/Physically safe environment - no spills, clutter or unnecessary equipment/Purposeful Proactive Rounding/Room/bathroom lighting operational, light cord in reach

## 2022-10-12 LAB — SURGICAL PATHOLOGY STUDY: SIGNIFICANT CHANGE UP

## 2022-10-13 ENCOUNTER — APPOINTMENT (OUTPATIENT)
Dept: INTERNAL MEDICINE | Facility: CLINIC | Age: 67
End: 2022-10-13

## 2022-10-13 ENCOUNTER — NON-APPOINTMENT (OUTPATIENT)
Age: 67
End: 2022-10-13

## 2022-10-14 ENCOUNTER — MED ADMIN CHARGE (OUTPATIENT)
Age: 67
End: 2022-10-14

## 2022-10-14 ENCOUNTER — APPOINTMENT (OUTPATIENT)
Dept: INTERNAL MEDICINE | Facility: CLINIC | Age: 67
End: 2022-10-14

## 2022-10-14 VITALS
DIASTOLIC BLOOD PRESSURE: 94 MMHG | TEMPERATURE: 97.7 F | SYSTOLIC BLOOD PRESSURE: 160 MMHG | BODY MASS INDEX: 19.74 KG/M2 | WEIGHT: 133.25 LBS | HEIGHT: 69 IN | HEART RATE: 67 BPM | OXYGEN SATURATION: 100 %

## 2022-10-14 DIAGNOSIS — R76.8 OTHER SPECIFIED ABNORMAL IMMUNOLOGICAL FINDINGS IN SERUM: ICD-10-CM

## 2022-10-14 DIAGNOSIS — Z23 ENCOUNTER FOR IMMUNIZATION: ICD-10-CM

## 2022-10-14 PROCEDURE — G0008: CPT

## 2022-10-14 PROCEDURE — 99214 OFFICE O/P EST MOD 30 MIN: CPT | Mod: 25

## 2022-10-14 PROCEDURE — 90662 IIV NO PRSV INCREASED AG IM: CPT

## 2022-10-14 NOTE — HISTORY OF PRESENT ILLNESS
[FreeTextEntry1] : Follow-up for weight loss, smoking, history of lung cancer and pulmonary nodules and joint pain [de-identified] : Patient is a 67-year-old female with history of adenocarcinoma status post resection smoking currently, hypothyroidism, pulmonary nodules, thyroid nodules, vitamin D insufficiency, former drug and alcohol abuse, elevated elevated LANEY with joint pain hypertension history of hepatitis C status post treatment, cervical radiculopathy, who presents for follow-up patient feels well has gained 2 pounds since last visit feels well denies joint pain shortness of breath this exertion palpitation lightheadedness contemplating quitting smoking on the 21st of this month.

## 2022-10-14 NOTE — ASSESSMENT
[FreeTextEntry1] : Patient is a 67-year-old female with history of smoking, status post pneumonectomy for lung cancer adenocarcinoma the lung, former drug abuser alcoholic, emphysema, pulmonary nodules, hypothyroidism, hypertension, weight loss, prediabetes joint pain vitamin D insufficiency thyroid nodule who presents for follow-up\par \par 1 weight loss\par patient gained several pounds feels better\par colonoscopy negative\par mammogram negative\par observe\par \par 2.. Adenocarcinoma of the lung/pulmonary nodules\par repeat CT of the lung new nodules for stability\par \par 3 smoking\par patient smoking two packs per week\par quit date October 21\par \par 4. Hypertension\par well-controlled on medication hydrochlorothiazide 25 once a day\par \par 5. Prediabetes\par  on diet\par \par 6 elevated LANEY with diffuse joint pain\par referral to rheumatology LANEY 1 to 2560\par LANEY 1 to 2560 with spotted pattern\par \par 7. Hypothyroidism \par continue levothyroxine hundred micrograms PO Q day\par \par 8 health maintenance\par hi dose flu shot today\par counseled on COVID booster.\par \par

## 2022-10-17 LAB
ALBUMIN SERPL ELPH-MCNC: 4.6 G/DL
ALP BLD-CCNC: 67 U/L
ALT SERPL-CCNC: 22 U/L
ANION GAP SERPL CALC-SCNC: 20 MMOL/L
APPEARANCE: CLEAR
AST SERPL-CCNC: 26 U/L
BACTERIA: NEGATIVE
BASOPHILS # BLD AUTO: 0.04 K/UL
BASOPHILS NFR BLD AUTO: 0.7 %
BILIRUB SERPL-MCNC: 0.4 MG/DL
BILIRUBIN URINE: NEGATIVE
BLOOD URINE: NORMAL
BUN SERPL-MCNC: 16 MG/DL
CALCIUM SERPL-MCNC: 9.9 MG/DL
CHLORIDE SERPL-SCNC: 101 MMOL/L
CO2 SERPL-SCNC: 21 MMOL/L
COLOR: ABNORMAL
CREAT SERPL-MCNC: 0.69 MG/DL
EGFR: 96 ML/MIN/1.73M2
EOSINOPHIL # BLD AUTO: 0.27 K/UL
EOSINOPHIL NFR BLD AUTO: 4.6 %
ERYTHROCYTE [SEDIMENTATION RATE] IN BLOOD BY WESTERGREN METHOD: 54 MM/HR
GLUCOSE QUALITATIVE U: NEGATIVE
GLUCOSE SERPL-MCNC: 97 MG/DL
HCT VFR BLD CALC: 45.1 %
HGB BLD-MCNC: 14.4 G/DL
HYALINE CASTS: 0 /LPF
IMM GRANULOCYTES NFR BLD AUTO: 0.3 %
KETONES URINE: NEGATIVE
LEUKOCYTE ESTERASE URINE: NEGATIVE
LYMPHOCYTES # BLD AUTO: 1.98 K/UL
LYMPHOCYTES NFR BLD AUTO: 33.5 %
MAN DIFF?: NORMAL
MCHC RBC-ENTMCNC: 29.6 PG
MCHC RBC-ENTMCNC: 31.9 GM/DL
MCV RBC AUTO: 92.8 FL
MICROSCOPIC-UA: NORMAL
MONOCYTES # BLD AUTO: 0.48 K/UL
MONOCYTES NFR BLD AUTO: 8.1 %
NEUTROPHILS # BLD AUTO: 3.12 K/UL
NEUTROPHILS NFR BLD AUTO: 52.8 %
NITRITE URINE: NEGATIVE
PH URINE: 6.5
PLATELET # BLD AUTO: 241 K/UL
POTASSIUM SERPL-SCNC: 4.2 MMOL/L
PROT SERPL-MCNC: 8.3 G/DL
PROTEIN URINE: NORMAL
RBC # BLD: 4.86 M/UL
RBC # FLD: 13.2 %
RED BLOOD CELLS URINE: 6 /HPF
SODIUM SERPL-SCNC: 143 MMOL/L
SPECIFIC GRAVITY URINE: 1.02
SQUAMOUS EPITHELIAL CELLS: 2 /HPF
TSH SERPL-ACNC: 6.58 UIU/ML
UROBILINOGEN URINE: ABNORMAL
WBC # FLD AUTO: 5.91 K/UL
WHITE BLOOD CELLS URINE: 1 /HPF

## 2022-11-14 ENCOUNTER — RX RENEWAL (OUTPATIENT)
Age: 67
End: 2022-11-14

## 2023-01-06 ENCOUNTER — TRANSCRIPTION ENCOUNTER (OUTPATIENT)
Age: 68
End: 2023-01-06

## 2023-01-09 ENCOUNTER — NON-APPOINTMENT (OUTPATIENT)
Age: 68
End: 2023-01-09

## 2023-02-08 ENCOUNTER — TRANSCRIPTION ENCOUNTER (OUTPATIENT)
Age: 68
End: 2023-02-08

## 2023-03-23 ENCOUNTER — APPOINTMENT (OUTPATIENT)
Dept: INTERNAL MEDICINE | Facility: CLINIC | Age: 68
End: 2023-03-23
Payer: COMMERCIAL

## 2023-03-23 VITALS
TEMPERATURE: 97.4 F | WEIGHT: 134 LBS | HEART RATE: 72 BPM | HEIGHT: 69 IN | BODY MASS INDEX: 19.85 KG/M2 | OXYGEN SATURATION: 98 % | SYSTOLIC BLOOD PRESSURE: 137 MMHG | DIASTOLIC BLOOD PRESSURE: 74 MMHG

## 2023-03-23 PROCEDURE — 99214 OFFICE O/P EST MOD 30 MIN: CPT

## 2023-03-23 RX ORDER — VARENICLINE TARTRATE 25 MG
0.5 MG X 11 & KIT ORAL
Qty: 1 | Refills: 0 | Status: ACTIVE | COMMUNITY
Start: 2023-03-23 | End: 1900-01-01

## 2023-03-23 RX ORDER — NIRMATRELVIR AND RITONAVIR 300-100 MG
20 X 150 MG & KIT ORAL
Qty: 1 | Refills: 0 | Status: DISCONTINUED | COMMUNITY
Start: 2023-01-06 | End: 2023-03-23

## 2023-03-23 NOTE — HISTORY OF PRESENT ILLNESS
[FreeTextEntry1] : Follow-up for lung cancer hypertension hypothyroidism [de-identified] : The patient is a 68-year-old female with history of smoking, adenocarcinoma the lung status post resection, history of I VDA alcohol abuse, history of chronic hepatitis C status post treatment, positive LANEY prediabetes emphysema hypertension hypothyroidism who presents for follow-up patient continues to smoke two packs per week denies chest pain, shortness of breath this exertion recently diagnosed with COVID-19 in January status post paxlovid she denies fever chills cough shortness of breath palpitations

## 2023-03-23 NOTE — ASSESSMENT
[FreeTextEntry1] : Patient is a 68-year-old female with history of adenocarcinoma the lung status post resection, smoking, emphysema, hypertension, elevated LANEY, hypothyroidism, former I VDA, who presents for follow-up and smoking\par \par 1 history of COVID-19 infection\par symptoms resolved status post paxlovid\par \par 2 adenocarcinoma one\par status post resection\par encourage not to smoke\par \par 3 smoking\par trial of Chantix if available\par counseled at length on smoking cessation\par \par 4 hypothyroidism\par continue levothyroxine hundred micrograms next visit check TFTs\par \par 5 emphysema\par by CAT scan patient denies shortness of breath refuses inhalers\par \par Number six health maintenance\par schedule CPE in one month

## 2023-04-05 ENCOUNTER — TRANSCRIPTION ENCOUNTER (OUTPATIENT)
Age: 68
End: 2023-04-05

## 2023-05-04 ENCOUNTER — APPOINTMENT (OUTPATIENT)
Dept: INTERNAL MEDICINE | Facility: CLINIC | Age: 68
End: 2023-05-04
Payer: COMMERCIAL

## 2023-05-04 VITALS
DIASTOLIC BLOOD PRESSURE: 85 MMHG | TEMPERATURE: 97.7 F | OXYGEN SATURATION: 95 % | HEIGHT: 70 IN | WEIGHT: 133 LBS | SYSTOLIC BLOOD PRESSURE: 171 MMHG | BODY MASS INDEX: 19.04 KG/M2 | HEART RATE: 85 BPM

## 2023-05-04 VITALS — DIASTOLIC BLOOD PRESSURE: 80 MMHG | SYSTOLIC BLOOD PRESSURE: 140 MMHG

## 2023-05-04 DIAGNOSIS — Z00.00 ENCOUNTER FOR GENERAL ADULT MEDICAL EXAMINATION W/OUT ABNORMAL FINDINGS: ICD-10-CM

## 2023-05-04 DIAGNOSIS — B18.2 CHRONIC VIRAL HEPATITIS C: ICD-10-CM

## 2023-05-04 DIAGNOSIS — E04.1 NONTOXIC SINGLE THYROID NODULE: ICD-10-CM

## 2023-05-04 LAB
BILIRUB UR QL STRIP: NORMAL
CLARITY UR: CLEAR
COLLECTION METHOD: NORMAL
GLUCOSE UR-MCNC: NORMAL
HCG UR QL: 0.2 EU/DL
HGB UR QL STRIP.AUTO: NORMAL
KETONES UR-MCNC: NORMAL
LEUKOCYTE ESTERASE UR QL STRIP: NORMAL
NITRITE UR QL STRIP: NORMAL
PH UR STRIP: 8.5
PROT UR STRIP-MCNC: 30
SP GR UR STRIP: 1.02

## 2023-05-04 PROCEDURE — 99397 PER PM REEVAL EST PAT 65+ YR: CPT | Mod: 25

## 2023-05-04 PROCEDURE — 93000 ELECTROCARDIOGRAM COMPLETE: CPT

## 2023-05-04 PROCEDURE — 81003 URINALYSIS AUTO W/O SCOPE: CPT | Mod: QW

## 2023-05-04 PROCEDURE — 36415 COLL VENOUS BLD VENIPUNCTURE: CPT

## 2023-05-04 NOTE — ASSESSMENT
[FreeTextEntry1] : Patient is a 68-year-old female with history of former I VDA quit 1991, former alcoholic, smoking since age 11 presently two packs per week, adenocarcinoma the lung, emphysema, hypertension, prediabetes, thyroid nodule, hypothyroidism, mild osteopenia, history hepatitis C status post treatment, osteoarthritis, positive LANEY, seasonal allergies, who presents for comprehensive annual physical examination\par \par 1 smoking\par patient with intake chantix and will give quit date\par agreed to decrease to one pack per week\par follow-up in three months\par \par 2. Emphysema\par no shortness of breath refuses inhalers\par \par 3 adenocarcinoma the lung/nodules\par encouraged to set quit smoking continue CT screening\par \par 4 hypertension\par continue hydrochlorothiazide 25 mg once a day\par check electrolytes EKG no LVH\par will observe for now smoke cigarette before came into office.\par Point-of-care urine negative\par 5. Prediabetes\par counseling diet check hemoglobin A1c\par \par 6. Hypothyroidism\par continue levothyroxine hundred micrograms PO Q day\par check TFTs clinically you thyroid\par \par 7 history of thyroid nodules\par right lobe 1.1 cm x 1.1 cm x 0.7 cm stable TT!RAD  3 but less than 1.5\par \par 8 osteopenia\par calcium vitamin D exercise\par observe\par \par 9 health maintenance\par schedule mammogram\par continue CT screening of lung\par refuses vaccines\par check routine labs UA. All labs and point-of-care urine done in office today.\par \par 9 history of hepatitis C\par status post therapy no evidence recurrence\par \par \par

## 2023-05-04 NOTE — PHYSICAL EXAM
[Normal Sclera/Conjunctiva] : normal sclera/conjunctiva [Normal Outer Ear/Nose] : the outer ears and nose were normal in appearance [No Axillary Lymphadenopathy] : no axillary lymphadenopathy [Normal] : affect was normal and insight and judgment were intact

## 2023-05-04 NOTE — HEALTH RISK ASSESSMENT
[Good] : ~his/her~  mood as  good [No] : No [Never (0 pts)] : Never (0 points) [No falls in past year] : Patient reported no falls in the past year [0] : 2) Feeling down, depressed, or hopeless: Not at all (0) [PHQ-2 Negative - No further assessment needed] : PHQ-2 Negative - No further assessment needed [Patient reported mammogram was normal] : Patient reported mammogram was normal [Patient reported colonoscopy was normal] : Patient reported colonoscopy was normal [HIV test declined] : HIV test declined [Alone] : lives alone [Employed] : employed [High School] : high school [Single] : single [Feels Safe at Home] : Feels safe at home [Fully functional (bathing, dressing, toileting, transferring, walking, feeding)] : Fully functional (bathing, dressing, toileting, transferring, walking, feeding) [Fully functional (using the telephone, shopping, preparing meals, housekeeping, doing laundry, using] : Fully functional and needs no help or supervision to perform IADLs (using the telephone, shopping, preparing meals, housekeeping, doing laundry, using transportation, managing medications and managing finances) [Reports normal functional visual acuity (ie: able to read med bottle)] : Reports normal functional visual acuity [Smoke Detector] : smoke detector [Carbon Monoxide Detector] : carbon monoxide detector [Safety elements used in home] : safety elements used in home [Seat Belt] :  uses seat belt [Sunscreen] : uses sunscreen [Audit-CScore] : 0 [de-identified] : Healthy [de-identified] : Walks [[id number]] : 0 [Change in mental status noted] : No change in mental status noted [Language] : denies difficulty with language [Behavior] : denies difficulty with behavior [Learning/Retaining New Information] : denies difficulty learning/retaining new information [Handling Complex Tasks] : denies difficulty handling complex tasks [Reasoning] : denies difficulty with reasoning [Spatial Ability and Orientation] : denies difficulty with spatial ability and orientation [Sexually Active] : not sexually active [High Risk Behavior] : no high risk behavior [Reports changes in hearing] : Reports no changes in hearing [Reports changes in vision] : Reports no changes in vision [Reports changes in dental health] : Reports no changes in dental health [Guns at Home] : no guns at home [Travel to Developing Areas] : does not  travel to developing areas [TB Exposure] : is not being exposed to tuberculosis [Caregiver Concerns] : does not have caregiver concerns [MammogramDate] : April 2022 [ColonoscopyDate] : 2021

## 2023-05-04 NOTE — HISTORY OF PRESENT ILLNESS
[FreeTextEntry1] : Comprehensive annual physical examination [de-identified] : The patient is a 68-year-old female with history of smoking presently two packs per week, adenocarcinoma status post lung resection, emphysema, history of I VDA, history of alcohol abuse, history of chronic hep C status post treatment, positive LANEY, prediabetes, hypertension, hypothyroidism, thyroid nodule, vitamin D deficiency, history of COVID-19 infection, cervical radiculopathy, allergic rhinitis, who presents for comprehensive annual physical examination. Patient feels well denies chest pain, shortness of breath this exertion palpitations but notes pain at the surgical site still she continues to smoke two packs per week but will be willing to cut back to one pack per week. She never took chantix due to fear of side effects.

## 2023-05-10 LAB
ALBUMIN SERPL ELPH-MCNC: 3.9 G/DL
ALP BLD-CCNC: 66 U/L
ALT SERPL-CCNC: 19 U/L
ANION GAP SERPL CALC-SCNC: 13 MMOL/L
AST SERPL-CCNC: 17 U/L
BASOPHILS # BLD AUTO: 0.06 K/UL
BASOPHILS NFR BLD AUTO: 0.8 %
BILIRUB SERPL-MCNC: 0.2 MG/DL
BUN SERPL-MCNC: 16 MG/DL
CALCIUM SERPL-MCNC: 10.3 MG/DL
CHLORIDE SERPL-SCNC: 106 MMOL/L
CHOLEST SERPL-MCNC: 201 MG/DL
CO2 SERPL-SCNC: 25 MMOL/L
CREAT SERPL-MCNC: 0.79 MG/DL
EGFR: 82 ML/MIN/1.73M2
EOSINOPHIL # BLD AUTO: 0.44 K/UL
EOSINOPHIL NFR BLD AUTO: 6.2 %
ESTIMATED AVERAGE GLUCOSE: 126 MG/DL
GLUCOSE SERPL-MCNC: 69 MG/DL
HBA1C MFR BLD HPLC: 6 %
HCT VFR BLD CALC: 41.8 %
HDLC SERPL-MCNC: 74 MG/DL
HGB BLD-MCNC: 13.7 G/DL
IMM GRANULOCYTES NFR BLD AUTO: 0.1 %
LDLC SERPL CALC-MCNC: 111 MG/DL
LYMPHOCYTES # BLD AUTO: 2.37 K/UL
LYMPHOCYTES NFR BLD AUTO: 33.6 %
MAN DIFF?: NORMAL
MCHC RBC-ENTMCNC: 30.5 PG
MCHC RBC-ENTMCNC: 32.8 GM/DL
MCV RBC AUTO: 93.1 FL
MONOCYTES # BLD AUTO: 0.68 K/UL
MONOCYTES NFR BLD AUTO: 9.6 %
NEUTROPHILS # BLD AUTO: 3.5 K/UL
NEUTROPHILS NFR BLD AUTO: 49.7 %
NONHDLC SERPL-MCNC: 127 MG/DL
PLATELET # BLD AUTO: 233 K/UL
POTASSIUM SERPL-SCNC: 4.4 MMOL/L
PROT SERPL-MCNC: 8 G/DL
RBC # BLD: 4.49 M/UL
RBC # FLD: 13.6 %
SODIUM SERPL-SCNC: 144 MMOL/L
T4 FREE SERPL-MCNC: 1.2 NG/DL
TRIGL SERPL-MCNC: 77 MG/DL
TSH SERPL-ACNC: 12.9 UIU/ML
WBC # FLD AUTO: 7.06 K/UL

## 2023-07-31 ENCOUNTER — TRANSCRIPTION ENCOUNTER (OUTPATIENT)
Age: 68
End: 2023-07-31

## 2023-08-10 ENCOUNTER — APPOINTMENT (OUTPATIENT)
Dept: INTERNAL MEDICINE | Facility: CLINIC | Age: 68
End: 2023-08-10

## 2023-08-10 ENCOUNTER — RX RENEWAL (OUTPATIENT)
Age: 68
End: 2023-08-10

## 2023-08-24 ENCOUNTER — APPOINTMENT (OUTPATIENT)
Dept: INTERNAL MEDICINE | Facility: CLINIC | Age: 68
End: 2023-08-24
Payer: COMMERCIAL

## 2023-08-24 ENCOUNTER — RESULT REVIEW (OUTPATIENT)
Age: 68
End: 2023-08-24

## 2023-08-24 VITALS
SYSTOLIC BLOOD PRESSURE: 152 MMHG | HEART RATE: 80 BPM | DIASTOLIC BLOOD PRESSURE: 86 MMHG | WEIGHT: 130.9 LBS | BODY MASS INDEX: 18.74 KG/M2 | OXYGEN SATURATION: 96 % | RESPIRATION RATE: 16 BRPM | HEIGHT: 70 IN

## 2023-08-24 DIAGNOSIS — J06.9 ACUTE UPPER RESPIRATORY INFECTION, UNSPECIFIED: ICD-10-CM

## 2023-08-24 PROCEDURE — 99214 OFFICE O/P EST MOD 30 MIN: CPT

## 2023-08-24 RX ORDER — IPRATROPIUM BROMIDE 42 UG/1
0.06 SPRAY NASAL 4 TIMES DAILY
Qty: 1 | Refills: 1 | Status: DISCONTINUED | COMMUNITY
Start: 2022-01-20 | End: 2023-08-24

## 2023-08-24 RX ORDER — SODIUM SULFATE, POTASSIUM SULFATE, MAGNESIUM SULFATE 17.5; 3.13; 1.6 G/ML; G/ML; G/ML
17.5-3.13-1.6 SOLUTION, CONCENTRATE ORAL
Qty: 1 | Refills: 0 | Status: DISCONTINUED | COMMUNITY
Start: 2022-06-03 | End: 2023-08-24

## 2023-08-25 VITALS — DIASTOLIC BLOOD PRESSURE: 84 MMHG | SYSTOLIC BLOOD PRESSURE: 138 MMHG

## 2023-08-25 LAB
RAPID RVP RESULT: DETECTED
RV+EV RNA SPEC QL NAA+PROBE: DETECTED
SARS-COV-2 RNA PNL RESP NAA+PROBE: NOT DETECTED

## 2023-08-25 NOTE — HISTORY OF PRESENT ILLNESS
[FreeTextEntry1] : Follow-up for fever cough started on antibiotics complaint of weight loss follow-up for hypothyroidism, hypertension, lung cancer smoking [de-identified] : Patient is a 68-year-old female with longstanding history of smoking currently 50 pack a day status post lung cancer surgery adenocarcinoma, hypothyroidism, former alcohol drug abuser, hypertension, osteoarthritis, hypothyroidism, cervical radiculopathy allergic rhinitis emphysema, vitamin D insufficiency who presents for follow-up patient notes slight improvement in cough since starting Zithromax denies shortness of breath palpitation continues to smoke quarter to a 50 pack a day complains mainly of persistent weight loss over the years has lost over 20 pounds denies nausea vomiting abdominal pain

## 2023-08-25 NOTE — PHYSICAL EXAM
[Normal Outer Ear/Nose] : the outer ears and nose were normal in appearance [Normal Supraclavicular Nodes] : no supraclavicular lymphadenopathy [Normal Posterior Cervical Nodes] : no posterior cervical lymphadenopathy [Normal Anterior Cervical Nodes] : no anterior cervical lymphadenopathy [Normal] : affect was normal and insight and judgment were intact

## 2023-08-25 NOTE — REVIEW OF SYSTEMS
[Fever] : no fever [Chills] : no chills [Fatigue] : fatigue [Hot Flashes] : no hot flashes [Night Sweats] : no night sweats [Recent Change In Weight] : ~T recent weight change [Earache] : no earache [Hearing Loss] : no hearing loss [Nosebleed] : no nosebleeds [Hoarseness] : hoarseness [Nasal Discharge] : nasal discharge [Sore Throat] : no sore throat [Postnasal Drip] : no postnasal drip [Shortness Of Breath] : no shortness of breath [Wheezing] : no wheezing [Cough] : cough [Dyspnea on Exertion] : no dyspnea on exertion [Negative] : Psychiatric

## 2023-08-25 NOTE — ASSESSMENT
[FreeTextEntry1] : Patient is a 68-year-old female with longstanding history of smoking currently smoking, emphysema, lung cancer status post surgery, hypertension, hypothyroidism, osteoarthritis, thyroid nodules, vitamin D insufficiency cervical radiculopathy vitamin D deficiency history history of chronic hep C status status therapy a who presents for follow-up of recent URI, follow-up of multiple medical conditions and complaint of weight loss . #1 acute URI Finish Zithromax Do nasal swab rule out COVID  2.  Weight loss Etiology unclear weight 2 years ago 159 currently down to 130 Almost 30 pound over 2 years History of lung cancer CT chest abdomen pelvis rule out malignancy  3.  Hypertension Borderline controlled on hydrochlorothiazide  4.  Hypothyroidism Continue levothyroxine 112 mcg check TFTs

## 2023-08-28 LAB
ESTIMATED AVERAGE GLUCOSE: 114 MG/DL
HBA1C MFR BLD HPLC: 5.6 %
T4 FREE SERPL-MCNC: 1.8 NG/DL
TSH SERPL-ACNC: 0.3 UIU/ML

## 2023-09-08 ENCOUNTER — APPOINTMENT (OUTPATIENT)
Dept: CT IMAGING | Facility: IMAGING CENTER | Age: 68
End: 2023-09-08
Payer: COMMERCIAL

## 2023-09-08 ENCOUNTER — OUTPATIENT (OUTPATIENT)
Dept: OUTPATIENT SERVICES | Facility: HOSPITAL | Age: 68
LOS: 1 days | End: 2023-09-08
Payer: COMMERCIAL

## 2023-09-08 DIAGNOSIS — R63.4 ABNORMAL WEIGHT LOSS: ICD-10-CM

## 2023-09-08 DIAGNOSIS — Z98.890 OTHER SPECIFIED POSTPROCEDURAL STATES: Chronic | ICD-10-CM

## 2023-09-08 DIAGNOSIS — C34.90 MALIGNANT NEOPLASM OF UNSPECIFIED PART OF UNSPECIFIED BRONCHUS OR LUNG: ICD-10-CM

## 2023-09-08 DIAGNOSIS — Z00.8 ENCOUNTER FOR OTHER GENERAL EXAMINATION: ICD-10-CM

## 2023-09-08 PROCEDURE — 74176 CT ABD & PELVIS W/O CONTRAST: CPT | Mod: 26

## 2023-09-08 PROCEDURE — 71250 CT THORAX DX C-: CPT | Mod: 26

## 2023-09-08 PROCEDURE — 74176 CT ABD & PELVIS W/O CONTRAST: CPT

## 2023-09-08 PROCEDURE — 71250 CT THORAX DX C-: CPT

## 2023-09-21 ENCOUNTER — TRANSCRIPTION ENCOUNTER (OUTPATIENT)
Age: 68
End: 2023-09-21

## 2023-09-22 ENCOUNTER — APPOINTMENT (OUTPATIENT)
Dept: INTERNAL MEDICINE | Facility: CLINIC | Age: 68
End: 2023-09-22
Payer: COMMERCIAL

## 2023-09-22 VITALS
WEIGHT: 129 LBS | TEMPERATURE: 97.5 F | HEIGHT: 70 IN | BODY MASS INDEX: 18.47 KG/M2 | OXYGEN SATURATION: 98 % | SYSTOLIC BLOOD PRESSURE: 147 MMHG | DIASTOLIC BLOOD PRESSURE: 87 MMHG | HEART RATE: 79 BPM

## 2023-09-22 DIAGNOSIS — H92.01 OTALGIA, RIGHT EAR: ICD-10-CM

## 2023-09-22 PROCEDURE — 99213 OFFICE O/P EST LOW 20 MIN: CPT

## 2023-09-22 RX ORDER — ACETIC ACID 20 MG/ML
2 SOLUTION AURICULAR (OTIC) 3 TIMES DAILY
Qty: 1 | Refills: 0 | Status: ACTIVE | COMMUNITY
Start: 2023-09-22 | End: 1900-01-01

## 2023-10-05 DIAGNOSIS — H93.90 UNSPECIFIED DISORDER OF EAR, UNSPECIFIED EAR: ICD-10-CM

## 2023-10-09 ENCOUNTER — TRANSCRIPTION ENCOUNTER (OUTPATIENT)
Age: 68
End: 2023-10-09

## 2024-02-12 ENCOUNTER — APPOINTMENT (OUTPATIENT)
Dept: INTERNAL MEDICINE | Facility: CLINIC | Age: 69
End: 2024-02-12
Payer: COMMERCIAL

## 2024-02-12 VITALS
SYSTOLIC BLOOD PRESSURE: 138 MMHG | WEIGHT: 122 LBS | HEIGHT: 70 IN | RESPIRATION RATE: 16 BRPM | DIASTOLIC BLOOD PRESSURE: 70 MMHG | OXYGEN SATURATION: 97 % | HEART RATE: 76 BPM | BODY MASS INDEX: 17.47 KG/M2

## 2024-02-12 VITALS — DIASTOLIC BLOOD PRESSURE: 70 MMHG | SYSTOLIC BLOOD PRESSURE: 124 MMHG

## 2024-02-12 DIAGNOSIS — R07.89 OTHER CHEST PAIN: ICD-10-CM

## 2024-02-12 DIAGNOSIS — U07.1 COVID-19: ICD-10-CM

## 2024-02-12 DIAGNOSIS — J02.9 ACUTE PHARYNGITIS, UNSPECIFIED: ICD-10-CM

## 2024-02-12 PROCEDURE — 99214 OFFICE O/P EST MOD 30 MIN: CPT

## 2024-02-12 RX ORDER — LIDOCAINE 5% 700 MG/1
5 PATCH TOPICAL
Qty: 30 | Refills: 1 | Status: ACTIVE | COMMUNITY
Start: 2024-02-12 | End: 1900-01-01

## 2024-02-12 NOTE — HISTORY OF PRESENT ILLNESS
[FreeTextEntry1] : Follow-up for multiple medical problems including weight loss and complaint of sore throat x 24 hours [de-identified] : Patient is a 57-year-old female with history of longstanding smoking, lung cancer status post resection, hypothyroidism, hypertension, prediabetes, former IVDA alcohol abuser,, cervical radiculopathy, hypothyroidism, prediabetes, emphysema, persistent smoking, who presents for follow-up patient complains of 24-hour of sore throat denies fever chills sick contacts.  Patient also notes persistent weight loss denies abdominal pain nausea vomiting constipation diarrhea.

## 2024-02-12 NOTE — ASSESSMENT
[FreeTextEntry1] : Is a 68-year-old female with history of former IVDA alcohol abuser, Chronic hep C status posttreatment, smoker persistent 2 to 3 cigarettes a day, lung cancer status post resection, hypertension, prediabetes, emphysema, hypothyroidism, arthritis, who presents for follow-up and complaint of weight loss and sore throat  1.  Sore throat Viral URI Respiratory swab done rule out COVID  2.  Weight loss Continues to lose weight suggested supplement Ensure or boost Check labs including thyroid function CBC CHEM profile CT of abdomen pelvis and chest negative in September 2023  3.  Hypertension Controlled with hydrochlorothiazide 25 once a day check electrolytes  4.  Hypothyroidism Continue levothyroxine 112 mcg check TFTs  5.  Smoking Counseled on cessation including smoking marijuana suggested Gummies  6 right-sided chest wall pain Trial of Lidoderm patch persistent pain since surgery  7.  Prediabetes Check glucose  Follow-up in 6 weeks

## 2024-02-13 LAB
ALBUMIN SERPL ELPH-MCNC: 4.1 G/DL
ALP BLD-CCNC: 61 U/L
ALT SERPL-CCNC: 25 U/L
ANION GAP SERPL CALC-SCNC: 9 MMOL/L
AST SERPL-CCNC: 23 U/L
BILIRUB SERPL-MCNC: 0.2 MG/DL
BUN SERPL-MCNC: 15 MG/DL
CALCIUM SERPL-MCNC: 9.4 MG/DL
CHLORIDE SERPL-SCNC: 103 MMOL/L
CO2 SERPL-SCNC: 26 MMOL/L
CREAT SERPL-MCNC: 0.69 MG/DL
EGFR: 96 ML/MIN/1.73M2
GLUCOSE SERPL-MCNC: 93 MG/DL
HCT VFR BLD CALC: 42.3 %
HGB BLD-MCNC: 13.6 G/DL
MCHC RBC-ENTMCNC: 29.7 PG
MCHC RBC-ENTMCNC: 32.2 GM/DL
MCV RBC AUTO: 92.4 FL
PLATELET # BLD AUTO: 233 K/UL
POTASSIUM SERPL-SCNC: 4.3 MMOL/L
PROT SERPL-MCNC: 7.4 G/DL
RAPID RVP RESULT: NOT DETECTED
RBC # BLD: 4.58 M/UL
RBC # FLD: 13.1 %
SARS-COV-2 RNA PNL RESP NAA+PROBE: NOT DETECTED
SODIUM SERPL-SCNC: 138 MMOL/L
T4 FREE SERPL-MCNC: 1.6 NG/DL
TSH SERPL-ACNC: 1.16 UIU/ML
WBC # FLD AUTO: 4.22 K/UL

## 2024-03-18 ENCOUNTER — APPOINTMENT (OUTPATIENT)
Dept: INTERNAL MEDICINE | Facility: CLINIC | Age: 69
End: 2024-03-18
Payer: COMMERCIAL

## 2024-03-18 VITALS
HEIGHT: 70 IN | BODY MASS INDEX: 17.61 KG/M2 | HEART RATE: 77 BPM | OXYGEN SATURATION: 99 % | SYSTOLIC BLOOD PRESSURE: 129 MMHG | DIASTOLIC BLOOD PRESSURE: 78 MMHG | TEMPERATURE: 98.3 F | WEIGHT: 123 LBS | RESPIRATION RATE: 16 BRPM

## 2024-03-18 DIAGNOSIS — E03.9 HYPOTHYROIDISM, UNSPECIFIED: ICD-10-CM

## 2024-03-18 DIAGNOSIS — F17.200 NICOTINE DEPENDENCE, UNSPECIFIED, UNCOMPLICATED: ICD-10-CM

## 2024-03-18 DIAGNOSIS — R73.03 PREDIABETES.: ICD-10-CM

## 2024-03-18 DIAGNOSIS — E06.3 AUTOIMMUNE THYROIDITIS: ICD-10-CM

## 2024-03-18 DIAGNOSIS — J43.9 EMPHYSEMA, UNSPECIFIED: ICD-10-CM

## 2024-03-18 PROCEDURE — 99214 OFFICE O/P EST MOD 30 MIN: CPT

## 2024-03-18 RX ORDER — DICLOFENAC SODIUM 1% 10 MG/G
1 GEL TOPICAL DAILY
Qty: 1 | Refills: 0 | Status: DISCONTINUED | COMMUNITY
Start: 2022-02-01 | End: 2024-03-18

## 2024-03-18 RX ORDER — AZITHROMYCIN 250 MG/1
250 TABLET, FILM COATED ORAL
Qty: 1 | Refills: 0 | Status: DISCONTINUED | COMMUNITY
Start: 2023-08-22 | End: 2024-03-18

## 2024-03-18 RX ORDER — LEVOTHYROXINE SODIUM 0.11 MG/1
112 TABLET ORAL
Qty: 90 | Refills: 3 | Status: ACTIVE | COMMUNITY
Start: 2018-02-21 | End: 1900-01-01

## 2024-03-18 RX ORDER — NICOTINE 21 MG/24HR
14 PATCH, TRANSDERMAL 24 HOURS TRANSDERMAL DAILY
Qty: 30 | Refills: 0 | Status: DISCONTINUED | COMMUNITY
Start: 2022-05-10 | End: 2024-03-18

## 2024-03-18 NOTE — ASSESSMENT
[FreeTextEntry1] : Patient is a 69-year-old former alcoholic drug abuser, smoker 2 packs/week lung cancer status post resection no recurrence at present time, hypothyroidism, hypertension, borderline hypercholesterolemia chronic hep C status post therapy, hypothyroidism prediabetes who presents for follow-up  1.  Lung cancer Adenocarcinoma status post resection no evidence recurrence counseled on cessation  2.  Smoking Continues smoke 2 packs/week discussed options patient refuses at present time we will try cutting down  3.  Hypertension Borderline control however patient was late stressed about talking about smoking Continue hydrochlorothiazide check next visit  4.  Hypothyroidism Clinically and laboratory euthyroid continue levothyroxine 112 mcg p.o. daily  5.  Prediabetes Recent A1c normal we will observe

## 2024-03-18 NOTE — HISTORY OF PRESENT ILLNESS
[FreeTextEntry1] : Follow-up for multiple medical issues [de-identified] : The patient is a 69-year-old female with history of former drug and alcohol abuser, smoking lung cancer continues to smoke 2 packs/week, hypertension, hypothyroidism history of chronic hep C status posttherapy,GERD hypothyroidism borderline hypercholesterolemia cervical radiculopathy prediabetes, history of thyroid nodule vitamin D insufficiency who presents for follow-up.  Patient feels well denies chest pain, shortness of breath dyspnea exertion palpitation however continues to smoke 2 packs/week she denies cough shortness of breath palpitation.  Patient has continued to gain weight with boost supplements

## 2024-06-17 ENCOUNTER — APPOINTMENT (OUTPATIENT)
Dept: INTERNAL MEDICINE | Facility: CLINIC | Age: 69
End: 2024-06-17
Payer: COMMERCIAL

## 2024-06-17 VITALS
SYSTOLIC BLOOD PRESSURE: 122 MMHG | BODY MASS INDEX: 19.79 KG/M2 | WEIGHT: 138.2 LBS | TEMPERATURE: 97.8 F | HEART RATE: 73 BPM | RESPIRATION RATE: 16 BRPM | HEIGHT: 70 IN | DIASTOLIC BLOOD PRESSURE: 84 MMHG | OXYGEN SATURATION: 98 %

## 2024-06-17 DIAGNOSIS — F17.210 NICOTINE DEPENDENCE, CIGARETTES, UNCOMPLICATED: ICD-10-CM

## 2024-06-17 DIAGNOSIS — R63.4 ABNORMAL WEIGHT LOSS: ICD-10-CM

## 2024-06-17 DIAGNOSIS — I10 ESSENTIAL (PRIMARY) HYPERTENSION: ICD-10-CM

## 2024-06-17 DIAGNOSIS — C34.90 MALIGNANT NEOPLASM OF UNSPECIFIED PART OF UNSPECIFIED BRONCHUS OR LUNG: ICD-10-CM

## 2024-06-17 PROCEDURE — 99213 OFFICE O/P EST LOW 20 MIN: CPT

## 2024-06-17 NOTE — HISTORY OF PRESENT ILLNESS
[FreeTextEntry1] : Follow-up for hypertension, smoking, lung cancer, hypothyroidism [de-identified] : The patient is a 69-year-old former alcohol abuser drug abuser, smoker 2 packs a week, adenocarcinoma status post resection, hypothyroidism, history of prediabetes, vitamin D deficiency thyroid nodule who presents COPD/emphysema, who presents for follow-up.  Patient complains of bilateral arm numbness tingling in [place] underwent MRI showing severe bilateral foraminal foraminal narrowing C5-C6, moderate and anterior thecal sac effacement with ventral cord flattening at C5-C6 due to posterior osteophyte, right paracentral disc protrusion at C3-C4 with the disc fragment measuring roughly 5 x 4 mm which cause moderate anterior thecal thecal sac effacement with ventral cord flattening, a tiny T2 bright lesion in the superficial left parotid gland they could be sequela of chronic inflammation patient denies cheek pain but complains of bilateral numbness tingling pain denies chest pain shortness of breath dyspnea exertion feels well denies cold or heat intolerance.

## 2024-06-17 NOTE — ASSESSMENT
[FreeTextEntry1] : Lynette is a 69-year-old former drug and alcohol abuser smoker 2 packs/week now, adenocarcinoma of the lung status post resection, hypothyroidism, history of hep C treated, COPD/emphysema, prediabetes, history of positive LANEY, thyroid nodules, who presents for follow-up  1.  1 smoking Patient refused to set his quit date Agreed to cut down to 1 pack/day by the full Warned of risk of continuing to smoke  2.  COPD/emphysema Patient continues to smoke but denies cough shortness of breath  3.  Hypothyroidism Clinically and laboratory euthyroid we will continue to monitor  continue levothyroxine 112 mics daily  4.  Weight loss Patient weight stabilized weight up 15 pounds  5 hypertension Continue hydrochlorothiazide 25 mg once a day  Follow-up in 3 months.

## 2024-09-30 ENCOUNTER — APPOINTMENT (OUTPATIENT)
Dept: INTERNAL MEDICINE | Facility: CLINIC | Age: 69
End: 2024-09-30
Payer: COMMERCIAL

## 2024-09-30 ENCOUNTER — NON-APPOINTMENT (OUTPATIENT)
Age: 69
End: 2024-09-30

## 2024-09-30 VITALS — SYSTOLIC BLOOD PRESSURE: 138 MMHG | DIASTOLIC BLOOD PRESSURE: 82 MMHG

## 2024-09-30 VITALS
DIASTOLIC BLOOD PRESSURE: 78 MMHG | OXYGEN SATURATION: 100 % | WEIGHT: 140 LBS | RESPIRATION RATE: 14 BRPM | SYSTOLIC BLOOD PRESSURE: 138 MMHG | BODY MASS INDEX: 20.04 KG/M2 | HEART RATE: 78 BPM | TEMPERATURE: 97.9 F | HEIGHT: 70 IN

## 2024-09-30 DIAGNOSIS — I10 ESSENTIAL (PRIMARY) HYPERTENSION: ICD-10-CM

## 2024-09-30 DIAGNOSIS — Z00.00 ENCOUNTER FOR GENERAL ADULT MEDICAL EXAMINATION W/OUT ABNORMAL FINDINGS: ICD-10-CM

## 2024-09-30 DIAGNOSIS — M54.12 RADICULOPATHY, CERVICAL REGION: ICD-10-CM

## 2024-09-30 DIAGNOSIS — L98.9 DISORDER OF THE SKIN AND SUBCUTANEOUS TISSUE, UNSPECIFIED: ICD-10-CM

## 2024-09-30 DIAGNOSIS — C34.90 MALIGNANT NEOPLASM OF UNSPECIFIED PART OF UNSPECIFIED BRONCHUS OR LUNG: ICD-10-CM

## 2024-09-30 DIAGNOSIS — F17.210 NICOTINE DEPENDENCE, CIGARETTES, UNCOMPLICATED: ICD-10-CM

## 2024-09-30 DIAGNOSIS — J43.9 EMPHYSEMA, UNSPECIFIED: ICD-10-CM

## 2024-09-30 DIAGNOSIS — B18.2 CHRONIC VIRAL HEPATITIS C: ICD-10-CM

## 2024-09-30 DIAGNOSIS — R63.4 ABNORMAL WEIGHT LOSS: ICD-10-CM

## 2024-09-30 LAB
ALBUMIN SERPL ELPH-MCNC: 4.2 G/DL
ALP BLD-CCNC: 68 U/L
ALT SERPL-CCNC: 25 U/L
ANION GAP SERPL CALC-SCNC: 15 MMOL/L
AST SERPL-CCNC: 28 U/L
BILIRUB SERPL-MCNC: 0.3 MG/DL
BUN SERPL-MCNC: 22 MG/DL
CALCIUM SERPL-MCNC: 10.2 MG/DL
CHLORIDE SERPL-SCNC: 102 MMOL/L
CHOLEST SERPL-MCNC: 215 MG/DL
CO2 SERPL-SCNC: 23 MMOL/L
CREAT SERPL-MCNC: 0.75 MG/DL
EGFR: 87 ML/MIN/1.73M2
ESTIMATED AVERAGE GLUCOSE: 126 MG/DL
GLUCOSE SERPL-MCNC: 93 MG/DL
HBA1C MFR BLD HPLC: 6 %
HCT VFR BLD CALC: 47.7 %
HDLC SERPL-MCNC: 74 MG/DL
HGB BLD-MCNC: 15.5 G/DL
LDLC SERPL CALC-MCNC: 125 MG/DL
MCHC RBC-ENTMCNC: 30.4 PG
MCHC RBC-ENTMCNC: 32.5 GM/DL
MCV RBC AUTO: 93.5 FL
NONHDLC SERPL-MCNC: 141 MG/DL
PLATELET # BLD AUTO: 194 K/UL
POTASSIUM SERPL-SCNC: 5.2 MMOL/L
PROT SERPL-MCNC: 8.3 G/DL
RBC # BLD: 5.1 M/UL
RBC # FLD: 13.1 %
SODIUM SERPL-SCNC: 141 MMOL/L
T4 FREE SERPL-MCNC: 1.8 NG/DL
TRIGL SERPL-MCNC: 93 MG/DL
TSH SERPL-ACNC: 0.42 UIU/ML
WBC # FLD AUTO: 6.33 K/UL

## 2024-09-30 PROCEDURE — 90662 IIV NO PRSV INCREASED AG IM: CPT

## 2024-09-30 PROCEDURE — 99397 PER PM REEVAL EST PAT 65+ YR: CPT | Mod: 25

## 2024-09-30 PROCEDURE — G0008: CPT

## 2024-09-30 PROCEDURE — 93000 ELECTROCARDIOGRAM COMPLETE: CPT

## 2024-09-30 NOTE — HISTORY OF PRESENT ILLNESS
[FreeTextEntry1] : Comprehensive annual physical examination.   [de-identified] : The patient is a 69-year-old female with history of adenocarcinoma of the lung status post resection continues to smoke 1/4 pack/day, history of IVDA alcohol use disorder abstinent, history of chronic hep C status posttreatment, history of positive LANEY, prediabetes, emphysema, hypertension, hypothyroidism, who presents for comprehensive annual physical examination..  Patient denies chest pain shortness of breath dyspnea exertion palpitations she continues to smoke quarter pack per day complains of lesion on her left thigh

## 2024-09-30 NOTE — ASSESSMENT
[FreeTextEntry1] : Patient is a 69-year-old female with history of adenocarcinoma continues to smoke cigarettes quarter pack per day, history of IVDA alcohol use disorder, chronic hep C status posttreatment undetectable, positive LANEY, prediabetes, emphysema, hypertension, hypothyroidism who presents for comprehensive annual physical examination complaining of lesion on thigh  1 emphysema continues to smoke CT lung cancer screen EKG shows right atrial enlargement Refused inhalers Refused to quit smoking  2 hypertension Continue hydrochlorothiazide 25 mg once a day check electrolytes  3.  Prediabetes Counseled on diet check A1c  4.  Hypothyroidism Continue levothyroxine 112 mcg daily Check TFTs  5.  Neck pain No-fault case May use Lidoderm patch and Aleve Follow-up with pain management  6.  History of hepatitis C Undetectable status posttreatment  7 lung cancer Adenocarcinoma continues to smoke tried's patient refused to quit  8 health maintenance High-dose flu shot today check routine labs Follow-up in 4 to 6 months

## 2024-09-30 NOTE — HEALTH RISK ASSESSMENT
[Very Good] : ~his/her~  mood as very good [No] : No [Never (0 pts)] : Never (0 points) [0] : 2) Feeling down, depressed, or hopeless: Not at all (0) [PHQ-2 Negative - No further assessment needed] : PHQ-2 Negative - No further assessment needed [Current] : Current [20 or more] : 20 or more [NO] : No [Patient reported colonoscopy was normal] : Patient reported colonoscopy was normal [HIV test declined] : HIV test declined [Hepatitis C test declined] : Hepatitis C test declined [None] : None [Alone] : lives alone [# of Members in Household ___] :  household currently consist of [unfilled] member(s) [Employed] : employed [High School] : high school [Single] : single [# Of Children ___] : has [unfilled] children [Sexually Active] : sexually active [Feels Safe at Home] : Feels safe at home [Fully functional (bathing, dressing, toileting, transferring, walking, feeding)] : Fully functional (bathing, dressing, toileting, transferring, walking, feeding) [Fully functional (using the telephone, shopping, preparing meals, housekeeping, doing laundry, using] : Fully functional and needs no help or supervision to perform IADLs (using the telephone, shopping, preparing meals, housekeeping, doing laundry, using transportation, managing medications and managing finances) [Reports normal functional visual acuity (ie: able to read med bottle)] : Reports normal functional visual acuity [Smoke Detector] : smoke detector [Carbon Monoxide Detector] : carbon monoxide detector [Safety elements used in home] : safety elements used in home [Seat Belt] :  uses seat belt [Sunscreen] : uses sunscreen [Audit-CScore] : 0 [de-identified] : Walks [de-identified] : Healthy [[id number]] : 0 [Change in mental status noted] : No change in mental status noted [Language] : denies difficulty with language [Behavior] : denies difficulty with behavior [Learning/Retaining New Information] : denies difficulty learning/retaining new information [Handling Complex Tasks] : denies difficulty handling complex tasks [Reasoning] : denies difficulty with reasoning [Spatial Ability and Orientation] : denies difficulty with spatial ability and orientation [Reports changes in hearing] : Reports no changes in hearing [Reports changes in vision] : Reports no changes in vision [Reports changes in dental health] : Reports no changes in dental health [Travel to Developing Areas] : does not  travel to developing areas [TB Exposure] : is not being exposed to tuberculosis [Caregiver Concerns] : does not have caregiver concerns [ColonoscopyDate] : October 2022

## 2024-09-30 NOTE — HISTORY OF PRESENT ILLNESS
[FreeTextEntry1] : Comprehensive annual physical examination.   [de-identified] : The patient is a 69-year-old female with history of adenocarcinoma of the lung status post resection continues to smoke 1/4 pack/day, history of IVDA alcohol use disorder abstinent, history of chronic hep C status posttreatment, history of positive LANEY, prediabetes, emphysema, hypertension, hypothyroidism, who presents for comprehensive annual physical examination..  Patient denies chest pain shortness of breath dyspnea exertion palpitations she continues to smoke quarter pack per day complains of lesion on her left thigh

## 2024-09-30 NOTE — HEALTH RISK ASSESSMENT
[Very Good] : ~his/her~  mood as very good [No] : No [Never (0 pts)] : Never (0 points) [0] : 2) Feeling down, depressed, or hopeless: Not at all (0) [PHQ-2 Negative - No further assessment needed] : PHQ-2 Negative - No further assessment needed [Current] : Current [20 or more] : 20 or more [NO] : No [Patient reported colonoscopy was normal] : Patient reported colonoscopy was normal [HIV test declined] : HIV test declined [Hepatitis C test declined] : Hepatitis C test declined [None] : None [Alone] : lives alone [# of Members in Household ___] :  household currently consist of [unfilled] member(s) [Employed] : employed [High School] : high school [Single] : single [# Of Children ___] : has [unfilled] children [Sexually Active] : sexually active [Feels Safe at Home] : Feels safe at home [Fully functional (bathing, dressing, toileting, transferring, walking, feeding)] : Fully functional (bathing, dressing, toileting, transferring, walking, feeding) [Fully functional (using the telephone, shopping, preparing meals, housekeeping, doing laundry, using] : Fully functional and needs no help or supervision to perform IADLs (using the telephone, shopping, preparing meals, housekeeping, doing laundry, using transportation, managing medications and managing finances) [Reports normal functional visual acuity (ie: able to read med bottle)] : Reports normal functional visual acuity [Smoke Detector] : smoke detector [Carbon Monoxide Detector] : carbon monoxide detector [Safety elements used in home] : safety elements used in home [Seat Belt] :  uses seat belt [Sunscreen] : uses sunscreen [Audit-CScore] : 0 [de-identified] : Walks [de-identified] : Healthy [[id number]] : 0 [Change in mental status noted] : No change in mental status noted [Language] : denies difficulty with language [Behavior] : denies difficulty with behavior [Learning/Retaining New Information] : denies difficulty learning/retaining new information [Handling Complex Tasks] : denies difficulty handling complex tasks [Reasoning] : denies difficulty with reasoning [Spatial Ability and Orientation] : denies difficulty with spatial ability and orientation [Reports changes in hearing] : Reports no changes in hearing [Reports changes in vision] : Reports no changes in vision [Reports changes in dental health] : Reports no changes in dental health [Travel to Developing Areas] : does not  travel to developing areas [TB Exposure] : is not being exposed to tuberculosis [Caregiver Concerns] : does not have caregiver concerns [ColonoscopyDate] : October 2022

## 2024-11-19 ENCOUNTER — APPOINTMENT (OUTPATIENT)
Dept: OBGYN | Facility: CLINIC | Age: 69
End: 2024-11-19

## 2024-11-19 VITALS
DIASTOLIC BLOOD PRESSURE: 90 MMHG | BODY MASS INDEX: 20.19 KG/M2 | WEIGHT: 141 LBS | HEIGHT: 70 IN | SYSTOLIC BLOOD PRESSURE: 130 MMHG

## 2024-11-19 DIAGNOSIS — Z13.820 ENCOUNTER FOR SCREENING FOR OSTEOPOROSIS: ICD-10-CM

## 2024-11-19 DIAGNOSIS — Z11.51 ENCOUNTER FOR SCREENING FOR HUMAN PAPILLOMAVIRUS (HPV): ICD-10-CM

## 2024-11-19 DIAGNOSIS — Z01.419 ENCOUNTER FOR GYNECOLOGICAL EXAMINATION (GENERAL) (ROUTINE) W/OUT ABNORMAL FINDINGS: ICD-10-CM

## 2024-11-19 PROCEDURE — 99387 INIT PM E/M NEW PAT 65+ YRS: CPT

## 2024-11-20 PROBLEM — Z01.419 PAPANICOLAOU SMEAR, AS PART OF ROUTINE GYNECOLOGICAL EXAMINATION: Status: ACTIVE | Noted: 2024-11-19

## 2024-11-20 LAB — HPV HIGH+LOW RISK DNA PNL CVX: NOT DETECTED

## 2024-11-27 LAB — CYTOLOGY CVX/VAG DOC THIN PREP: ABNORMAL

## 2024-12-05 ENCOUNTER — NON-APPOINTMENT (OUTPATIENT)
Age: 69
End: 2024-12-05

## 2024-12-05 VITALS — BODY MASS INDEX: 20.19 KG/M2 | HEIGHT: 70 IN | WEIGHT: 141 LBS

## 2024-12-05 DIAGNOSIS — F17.200 NICOTINE DEPENDENCE, UNSPECIFIED, UNCOMPLICATED: ICD-10-CM

## 2024-12-09 ENCOUNTER — OUTPATIENT (OUTPATIENT)
Dept: OUTPATIENT SERVICES | Facility: HOSPITAL | Age: 69
LOS: 1 days | End: 2024-12-09
Payer: COMMERCIAL

## 2024-12-09 ENCOUNTER — APPOINTMENT (OUTPATIENT)
Dept: RADIOLOGY | Facility: IMAGING CENTER | Age: 69
End: 2024-12-09
Payer: COMMERCIAL

## 2024-12-09 ENCOUNTER — APPOINTMENT (OUTPATIENT)
Dept: MAMMOGRAPHY | Facility: IMAGING CENTER | Age: 69
End: 2024-12-09
Payer: COMMERCIAL

## 2024-12-09 ENCOUNTER — RESULT REVIEW (OUTPATIENT)
Age: 69
End: 2024-12-09

## 2024-12-09 ENCOUNTER — APPOINTMENT (OUTPATIENT)
Dept: CT IMAGING | Facility: IMAGING CENTER | Age: 69
End: 2024-12-09
Payer: COMMERCIAL

## 2024-12-09 DIAGNOSIS — Z98.890 OTHER SPECIFIED POSTPROCEDURAL STATES: Chronic | ICD-10-CM

## 2024-12-09 DIAGNOSIS — Z00.8 ENCOUNTER FOR OTHER GENERAL EXAMINATION: ICD-10-CM

## 2024-12-09 PROCEDURE — 77063 BREAST TOMOSYNTHESIS BI: CPT | Mod: 26

## 2024-12-09 PROCEDURE — 77063 BREAST TOMOSYNTHESIS BI: CPT

## 2024-12-09 PROCEDURE — 77067 SCR MAMMO BI INCL CAD: CPT | Mod: 26

## 2024-12-09 PROCEDURE — 77080 DXA BONE DENSITY AXIAL: CPT | Mod: 26

## 2024-12-09 PROCEDURE — 71271 CT THORAX LUNG CANCER SCR C-: CPT | Mod: 26

## 2024-12-09 PROCEDURE — 77067 SCR MAMMO BI INCL CAD: CPT

## 2024-12-09 PROCEDURE — 77080 DXA BONE DENSITY AXIAL: CPT

## 2024-12-09 PROCEDURE — 71271 CT THORAX LUNG CANCER SCR C-: CPT

## 2024-12-16 ENCOUNTER — RESULT REVIEW (OUTPATIENT)
Age: 69
End: 2024-12-16

## 2024-12-16 ENCOUNTER — OUTPATIENT (OUTPATIENT)
Dept: OUTPATIENT SERVICES | Facility: HOSPITAL | Age: 69
LOS: 1 days | End: 2024-12-16
Payer: COMMERCIAL

## 2024-12-16 ENCOUNTER — APPOINTMENT (OUTPATIENT)
Dept: MAMMOGRAPHY | Facility: IMAGING CENTER | Age: 69
End: 2024-12-16
Payer: COMMERCIAL

## 2024-12-16 ENCOUNTER — APPOINTMENT (OUTPATIENT)
Dept: ULTRASOUND IMAGING | Facility: IMAGING CENTER | Age: 69
End: 2024-12-16
Payer: COMMERCIAL

## 2024-12-16 DIAGNOSIS — Z98.890 OTHER SPECIFIED POSTPROCEDURAL STATES: Chronic | ICD-10-CM

## 2024-12-16 DIAGNOSIS — Z00.8 ENCOUNTER FOR OTHER GENERAL EXAMINATION: ICD-10-CM

## 2024-12-16 PROCEDURE — G0279: CPT | Mod: 26

## 2024-12-16 PROCEDURE — 76642 ULTRASOUND BREAST LIMITED: CPT

## 2024-12-16 PROCEDURE — 76642 ULTRASOUND BREAST LIMITED: CPT | Mod: 26,50

## 2024-12-16 PROCEDURE — 77066 DX MAMMO INCL CAD BI: CPT | Mod: 26

## 2024-12-16 PROCEDURE — G0279: CPT

## 2024-12-16 PROCEDURE — 77066 DX MAMMO INCL CAD BI: CPT

## 2025-02-10 ENCOUNTER — APPOINTMENT (OUTPATIENT)
Dept: INTERNAL MEDICINE | Facility: CLINIC | Age: 70
End: 2025-02-10
Payer: COMMERCIAL

## 2025-02-10 VITALS
WEIGHT: 138.6 LBS | HEART RATE: 82 BPM | SYSTOLIC BLOOD PRESSURE: 132 MMHG | DIASTOLIC BLOOD PRESSURE: 70 MMHG | OXYGEN SATURATION: 98 % | TEMPERATURE: 98 F | BODY MASS INDEX: 19.84 KG/M2 | RESPIRATION RATE: 16 BRPM | HEIGHT: 70 IN

## 2025-02-10 VITALS — SYSTOLIC BLOOD PRESSURE: 128 MMHG | DIASTOLIC BLOOD PRESSURE: 70 MMHG

## 2025-02-10 DIAGNOSIS — R73.03 PREDIABETES.: ICD-10-CM

## 2025-02-10 DIAGNOSIS — I10 ESSENTIAL (PRIMARY) HYPERTENSION: ICD-10-CM

## 2025-02-10 DIAGNOSIS — E03.9 HYPOTHYROIDISM, UNSPECIFIED: ICD-10-CM

## 2025-02-10 DIAGNOSIS — E04.1 NONTOXIC SINGLE THYROID NODULE: ICD-10-CM

## 2025-02-10 DIAGNOSIS — C34.90 MALIGNANT NEOPLASM OF UNSPECIFIED PART OF UNSPECIFIED BRONCHUS OR LUNG: ICD-10-CM

## 2025-02-10 DIAGNOSIS — Z13.820 ENCOUNTER FOR SCREENING FOR OSTEOPOROSIS: ICD-10-CM

## 2025-02-10 LAB
BILIRUB UR QL STRIP: NEGATIVE
GLUCOSE UR-MCNC: NEGATIVE
HCG UR QL: 0.2 EU/DL
HGB UR QL STRIP.AUTO: NEGATIVE
KETONES UR-MCNC: NEGATIVE
LEUKOCYTE ESTERASE UR QL STRIP: NORMAL
NITRITE UR QL STRIP: NEGATIVE
PH UR STRIP: 5.5
PROT UR STRIP-MCNC: NEGATIVE
SP GR UR STRIP: 1.02

## 2025-02-10 PROCEDURE — 99214 OFFICE O/P EST MOD 30 MIN: CPT

## 2025-02-10 PROCEDURE — 81003 URINALYSIS AUTO W/O SCOPE: CPT | Mod: QW

## 2025-02-26 ENCOUNTER — TRANSCRIPTION ENCOUNTER (OUTPATIENT)
Age: 70
End: 2025-02-26

## 2025-02-26 ENCOUNTER — NON-APPOINTMENT (OUTPATIENT)
Age: 70
End: 2025-02-26

## 2025-03-03 ENCOUNTER — OUTPATIENT (OUTPATIENT)
Dept: OUTPATIENT SERVICES | Facility: HOSPITAL | Age: 70
LOS: 1 days | End: 2025-03-03
Payer: COMMERCIAL

## 2025-03-03 ENCOUNTER — APPOINTMENT (OUTPATIENT)
Dept: ULTRASOUND IMAGING | Facility: IMAGING CENTER | Age: 70
End: 2025-03-03
Payer: COMMERCIAL

## 2025-03-03 DIAGNOSIS — E04.1 NONTOXIC SINGLE THYROID NODULE: ICD-10-CM

## 2025-03-03 DIAGNOSIS — Z98.890 OTHER SPECIFIED POSTPROCEDURAL STATES: Chronic | ICD-10-CM

## 2025-03-03 PROCEDURE — 76536 US EXAM OF HEAD AND NECK: CPT

## 2025-03-03 PROCEDURE — 76536 US EXAM OF HEAD AND NECK: CPT | Mod: 26

## 2025-03-09 ENCOUNTER — NON-APPOINTMENT (OUTPATIENT)
Age: 70
End: 2025-03-09

## 2025-03-10 NOTE — H&P PST ADULT - NSICDXPROBLEM_GEN_ALL_CORE_FT
breath sounds clear and equal bilaterally.
PROBLEM DIAGNOSES  Problem: Malignant neoplasm of unspecified part of unspecified bronchus or lung  Assessment and Plan: Preop instructions provided including npo status, Hibiclens wash for infection control and GI prophylasix. Pt to c/w current meds, qam levothyroxine, HCTZ (no diuresis stated, needed for bp control), aware to stop any NSAIDS, OTC herbals or MVI on 4/14/2 . Verbilized understanding. BW done, pending results. DVT prophylasix as per primary team. MC pending, form provided. Has an appt on 4/8. Aware to f/u on covid testing prior to sx as per pst protocol and has an appt.
